# Patient Record
Sex: MALE | Race: WHITE | NOT HISPANIC OR LATINO | Employment: FULL TIME | URBAN - METROPOLITAN AREA
[De-identification: names, ages, dates, MRNs, and addresses within clinical notes are randomized per-mention and may not be internally consistent; named-entity substitution may affect disease eponyms.]

---

## 2024-01-30 ENCOUNTER — OFFICE VISIT (OUTPATIENT)
Dept: FAMILY MEDICINE CLINIC | Facility: CLINIC | Age: 34
End: 2024-01-30
Payer: COMMERCIAL

## 2024-01-30 VITALS
RESPIRATION RATE: 18 BRPM | OXYGEN SATURATION: 99 % | BODY MASS INDEX: 28.63 KG/M2 | HEIGHT: 73 IN | TEMPERATURE: 98.2 F | SYSTOLIC BLOOD PRESSURE: 121 MMHG | WEIGHT: 216 LBS | HEART RATE: 66 BPM | DIASTOLIC BLOOD PRESSURE: 70 MMHG

## 2024-01-30 DIAGNOSIS — Z11.59 ENCOUNTER FOR HCV SCREENING TEST FOR LOW RISK PATIENT: ICD-10-CM

## 2024-01-30 DIAGNOSIS — Z11.4 SCREENING FOR HIV (HUMAN IMMUNODEFICIENCY VIRUS): ICD-10-CM

## 2024-01-30 DIAGNOSIS — Z13.1 SCREENING FOR DIABETES MELLITUS (DM): ICD-10-CM

## 2024-01-30 DIAGNOSIS — Z13.220 SCREENING, LIPID: ICD-10-CM

## 2024-01-30 DIAGNOSIS — D22.5 MELANOCYTIC NEVI OF TRUNK: Primary | ICD-10-CM

## 2024-01-30 DIAGNOSIS — Z76.89 ENCOUNTER TO ESTABLISH CARE: ICD-10-CM

## 2024-01-30 PROCEDURE — 99203 OFFICE O/P NEW LOW 30 MIN: CPT

## 2024-01-30 NOTE — ASSESSMENT & PLAN NOTE
Patient has multiple melanocytic nevi of the trunk different sizes and shapes.  He states that a few of them may have changed in appearance over the last several years, but that nothing is changing rapidly.  He notes a family history of similar lesions, however denies family history of skin cancer.  Some lesions appear to be atypical nevi, others appear more similar to actinic keratoses.    Plan  Will refer to dermatology for skin check/further evaluation

## 2024-01-30 NOTE — ASSESSMENT & PLAN NOTE
Well-appearing 33-year-old male with no significant medical history.  Appropriate screening tests ordered.

## 2024-01-30 NOTE — PROGRESS NOTES
Family Medicine Follow-Up Office Visit  Johnny Nieto 33 y.o. male   MRN: [id number] : 1990  ENCOUNTER: 2024 11:37 AM    Assessment and Plan   Encounter to establish care  Well-appearing 33-year-old male with no significant medical history.  Appropriate screening tests ordered.    Melanocytic nevi of trunk  Patient has multiple melanocytic nevi of the trunk different sizes and shapes.  He states that a few of them may have changed in appearance over the last several years, but that nothing is changing rapidly.  He notes a family history of similar lesions, however denies family history of skin cancer.  Some lesions appear to be atypical nevi, others appear more similar to actinic keratoses.    Plan  Will refer to dermatology for skin check/further evaluation    BMI 28.0-28.9,adult  Will check TSH and lipid panel, follow-up after as indicated      Chief Complaint     Chief Complaint   Patient presents with    Establish Care       History of Present Illness   Johnny Nieto is a 33 y.o.-year-old male with no significant medical history who presents today for establishing care.  His only concern today is that he notes multiple moles on his torso that he states have been there for a long time and have changed minimally over that time.  He also notes that the lesions look similar to melanocytic lesions that are present in his mother.  He denies any family history of skin cancer.    Review of Systems   Review of Systems   Constitutional:  Negative for chills and fever.   HENT:  Negative for congestion, sore throat, tinnitus and trouble swallowing.    Respiratory:  Negative for chest tightness and shortness of breath.    Cardiovascular:  Negative for chest pain and leg swelling.   Gastrointestinal:  Negative for abdominal pain, constipation and diarrhea.   Skin:         Multiple melanocytic skin lesions, chronic   Neurological:  Negative for weakness and numbness.       Active Problem List  "    Patient Active Problem List   Diagnosis    Encounter to establish care    Melanocytic nevi of trunk    BMI 28.0-28.9,adult       Past Medical History, Past Surgical History, Family History, and Social History were reviewed and updated today as appropriate.    Objective   /70 (BP Location: Right arm, Patient Position: Sitting, Cuff Size: Large)   Pulse 66   Temp 98.2 °F (36.8 °C) (Tympanic)   Resp 18   Ht 6' 1\" (1.854 m)   Wt 98 kg (216 lb)   SpO2 99%   BMI 28.50 kg/m²     Physical Exam  Constitutional:       General: He is not in acute distress.     Appearance: Normal appearance.   HENT:      Head: Normocephalic and atraumatic.      Right Ear: External ear normal.      Left Ear: External ear normal.      Nose: Nose normal.      Mouth/Throat:      Mouth: Mucous membranes are moist.      Pharynx: Oropharynx is clear.   Eyes:      Extraocular Movements: Extraocular movements intact.      Conjunctiva/sclera: Conjunctivae normal.   Cardiovascular:      Rate and Rhythm: Normal rate and regular rhythm.      Pulses: Normal pulses.      Heart sounds: Normal heart sounds.   Pulmonary:      Effort: Pulmonary effort is normal.      Breath sounds: Normal breath sounds.   Abdominal:      Palpations: Abdomen is soft.      Tenderness: There is no abdominal tenderness.   Musculoskeletal:      Cervical back: Neck supple. No tenderness.      Right lower leg: No edema.      Left lower leg: No edema.   Skin:     General: Skin is warm and dry.      Comments: Multiple melanocytic lesions on trunk, some appear to be a mix of typical and atypical nevi, also actinic keratoses   Neurological:      Mental Status: He is alert and oriented to person, place, and time.   Psychiatric:         Mood and Affect: Mood normal.         Behavior: Behavior normal.       Diabetic Foot Exam    Pertinent Laboratory/Diagnostic Studies:    No results found for this or any previous visit.    Orders Placed This Encounter   Procedures    Hepatitis " C antibody    HIV 1/2 AG/AB w Reflex [facility] for 2 yr old and above    Comprehensive metabolic panel    TSH, 3rd generation with Free T4 reflex    Lipid panel    Ambulatory Referral to Dermatology         Current Medications     No current outpatient medications on file.     No current facility-administered medications for this visit.       ALLERGIES:  No Known Allergies    Health Maintenance     Health Maintenance   Topic Date Due    Hepatitis C Screening  Never done    COVID-19 Vaccine (1) Never done    HIV Screening  Never done    Annual Physical  Never done    DTaP,Tdap,and Td Vaccines (1 - Tdap) Never done    Influenza Vaccine (1) Never done    Depression Screening  01/30/2025    Zoster Vaccine (1 of 2) 04/04/2040    Pneumococcal Vaccine: Pediatrics (0 to 5 Years) and At-Risk Patients (6 to 64 Years)  Aged Out    HIB Vaccine  Aged Out    IPV Vaccine  Aged Out    Hepatitis A Vaccine  Aged Out    Meningococcal ACWY Vaccine  Aged Out    HPV Vaccine  Aged Out       There is no immunization history on file for this patient.      Compa Chamberlain DO   [facility]  1/30/2024  11:37 AM    Parts of this note were dictated using M*Modal dictation software and may have sounds-like errors due to variation in pronunciation.

## 2024-02-06 ENCOUNTER — APPOINTMENT (OUTPATIENT)
Dept: LAB | Facility: CLINIC | Age: 34
End: 2024-02-06
Payer: COMMERCIAL

## 2024-02-06 DIAGNOSIS — Z13.1 SCREENING FOR DIABETES MELLITUS (DM): ICD-10-CM

## 2024-02-06 DIAGNOSIS — Z11.4 SCREENING FOR HIV (HUMAN IMMUNODEFICIENCY VIRUS): ICD-10-CM

## 2024-02-06 DIAGNOSIS — Z11.59 ENCOUNTER FOR HCV SCREENING TEST FOR LOW RISK PATIENT: ICD-10-CM

## 2024-02-06 DIAGNOSIS — Z13.220 SCREENING, LIPID: ICD-10-CM

## 2024-02-06 LAB
ALBUMIN SERPL BCP-MCNC: 4.9 G/DL (ref 3.5–5)
ALP SERPL-CCNC: 52 U/L (ref 34–104)
ALT SERPL W P-5'-P-CCNC: 16 U/L (ref 7–52)
ANION GAP SERPL CALCULATED.3IONS-SCNC: 9 MMOL/L
AST SERPL W P-5'-P-CCNC: 19 U/L (ref 13–39)
BILIRUB SERPL-MCNC: 0.6 MG/DL (ref 0.2–1)
BUN SERPL-MCNC: 18 MG/DL (ref 5–25)
CALCIUM SERPL-MCNC: 9.8 MG/DL (ref 8.4–10.2)
CHLORIDE SERPL-SCNC: 104 MMOL/L (ref 96–108)
CHOLEST SERPL-MCNC: 158 MG/DL
CO2 SERPL-SCNC: 28 MMOL/L (ref 21–32)
CREAT SERPL-MCNC: 0.88 MG/DL (ref 0.6–1.3)
GFR SERPL CREATININE-BSD FRML MDRD: 112 ML/MIN/1.73SQ M
GLUCOSE P FAST SERPL-MCNC: 86 MG/DL (ref 65–99)
HCV AB SER QL: NORMAL
HDLC SERPL-MCNC: 46 MG/DL
HIV 1+2 AB+HIV1 P24 AG SERPL QL IA: NORMAL
HIV 2 AB SERPL QL IA: NORMAL
HIV1 AB SERPL QL IA: NORMAL
HIV1 P24 AG SERPL QL IA: NORMAL
LDLC SERPL CALC-MCNC: 101 MG/DL (ref 0–100)
NONHDLC SERPL-MCNC: 112 MG/DL
POTASSIUM SERPL-SCNC: 4.3 MMOL/L (ref 3.5–5.3)
PROT SERPL-MCNC: 7.5 G/DL (ref 6.4–8.4)
SODIUM SERPL-SCNC: 141 MMOL/L (ref 135–147)
TRIGL SERPL-MCNC: 54 MG/DL
TSH SERPL DL<=0.05 MIU/L-ACNC: 0.77 UIU/ML (ref 0.45–4.5)

## 2024-02-06 PROCEDURE — 80061 LIPID PANEL: CPT

## 2024-02-06 PROCEDURE — 80053 COMPREHEN METABOLIC PANEL: CPT

## 2024-02-06 PROCEDURE — 36415 COLL VENOUS BLD VENIPUNCTURE: CPT

## 2024-02-06 PROCEDURE — 86803 HEPATITIS C AB TEST: CPT

## 2024-02-06 PROCEDURE — 87389 HIV-1 AG W/HIV-1&-2 AB AG IA: CPT

## 2024-02-06 PROCEDURE — 84443 ASSAY THYROID STIM HORMONE: CPT

## 2024-03-12 ENCOUNTER — OFFICE VISIT (OUTPATIENT)
Dept: FAMILY MEDICINE CLINIC | Facility: CLINIC | Age: 34
End: 2024-03-12
Payer: COMMERCIAL

## 2024-03-12 VITALS
OXYGEN SATURATION: 99 % | TEMPERATURE: 97.7 F | SYSTOLIC BLOOD PRESSURE: 110 MMHG | BODY MASS INDEX: 28.36 KG/M2 | WEIGHT: 214 LBS | HEART RATE: 67 BPM | HEIGHT: 73 IN | DIASTOLIC BLOOD PRESSURE: 70 MMHG | RESPIRATION RATE: 18 BRPM

## 2024-03-12 DIAGNOSIS — Z00.00 ANNUAL PHYSICAL EXAM: Primary | ICD-10-CM

## 2024-03-12 PROCEDURE — 99395 PREV VISIT EST AGE 18-39: CPT

## 2024-03-12 NOTE — PROGRESS NOTES
ADULT ANNUAL PHYSICAL  [facility]    NAME: Johnny Nieto  AGE: 33 y.o. SEX: male  : 1990     DATE: 3/12/2024     Assessment and Plan:     Problem List Items Addressed This Visit    None  Visit Diagnoses       Annual physical exam    -  Primary              Immunizations and preventive care screenings were discussed with patient today. Appropriate education was printed on patient's after visit summary.    Counseling:  Dental Health: discussed importance of regular tooth brushing, flossing, and dental visits.  Sexual health: discussed sexually transmitted diseases, partner selection, use of condoms, avoidance of unintended pregnancy, and contraceptive alternatives.  Exercise: the importance of regular exercise/physical activity was discussed. Recommend exercise 3-5 times per week for at least 30 minutes.          Return in 1 year (on 3/12/2025).     Chief Complaint:     Chief Complaint   Patient presents with    Physical Exam      History of Present Illness:     Adult Annual Physical   Patient here for a comprehensive physical exam. The patient reports no problems.    Diet and Physical Activity  Diet/Nutrition: well balanced diet.   Exercise:  running a few times a week, somewhat limited by old hip injury. .      Depression Screening  PHQ-2/9 Depression Screening    Little interest or pleasure in doing things: 0 - not at all  Feeling down, depressed, or hopeless: 0 - not at all       General Health  Sleep: sleeps well.   Hearing: normal - bilateral.  Vision:  farsighted, no acute changes or difficulty presented at work .   Dental:  planning to establish w/ new dentist .        Health  History of STDs?: no.    Advanced Care Planning  Do you have an advanced directive? no  Do you have a durable medical power of ? No,  is default decision maker  ACP document given to the patient? no     Review of Systems:     Review of Systems    Constitutional:  Negative for chills, fatigue and fever.   HENT:  Negative for sore throat and trouble swallowing.    Eyes:  Negative for visual disturbance (rare occular migraines, decreasing in frequency).   Respiratory:  Negative for chest tightness and shortness of breath.    Cardiovascular:  Negative for chest pain.   Gastrointestinal:  Negative for abdominal pain, nausea and vomiting.   Musculoskeletal:  Positive for arthralgias (R hip, chronic).   Skin:  Negative for rash and wound.   Neurological:  Negative for dizziness, weakness and numbness.   Hematological:  Negative for adenopathy.   Psychiatric/Behavioral:  Negative for agitation, behavioral problems and confusion.       Past Medical History:     No past medical history on file.   Past Surgical History:     No past surgical history on file.   Social History:     Social History     Socioeconomic History    Marital status: /Civil Union     Spouse name: None    Number of children: None    Years of education: None    Highest education level: None   Occupational History    None   Tobacco Use    Smoking status: Never    Smokeless tobacco: Never   Vaping Use    Vaping status: Never Used   Substance and Sexual Activity    Alcohol use: Yes     Comment: Rare    Drug use: Never    Sexual activity: None   Other Topics Concern    None   Social History Narrative    None     Social Determinants of Health     Financial Resource Strain: Not on file   Food Insecurity: Not on file   Transportation Needs: Not on file   Physical Activity: Not on file   Stress: Not on file   Social Connections: Not on file   Intimate Partner Violence: Not on file   Housing Stability: Not on file      Family History:     No family history on file.   Current Medications:     No current outpatient medications on file.     No current facility-administered medications for this visit.      Allergies:     No Known Allergies   Physical Exam:     /70 (BP Location: Left arm, Patient  "Position: Sitting, Cuff Size: Large)   Pulse 67   Temp 97.7 °F (36.5 °C) (Tympanic)   Resp 18   Ht 6' 1\" (1.854 m)   Wt 97.1 kg (214 lb)   SpO2 99%   BMI 28.23 kg/m²     Physical Exam  Constitutional:       General: He is not in acute distress.     Appearance: He is not ill-appearing or toxic-appearing.   HENT:      Head: Normocephalic and atraumatic.      Nose: Nose normal.      Mouth/Throat:      Mouth: Mucous membranes are moist.      Pharynx: Oropharynx is clear.   Eyes:      Extraocular Movements: Extraocular movements intact.      Conjunctiva/sclera: Conjunctivae normal.      Pupils: Pupils are equal, round, and reactive to light.   Cardiovascular:      Rate and Rhythm: Normal rate and regular rhythm.      Heart sounds: Normal heart sounds.   Pulmonary:      Effort: Pulmonary effort is normal.      Breath sounds: Normal breath sounds.   Abdominal:      Palpations: Abdomen is soft.      Tenderness: There is no abdominal tenderness.   Musculoskeletal:      Cervical back: Normal range of motion and neck supple. No tenderness.      Right lower leg: No edema.      Left lower leg: No edema.   Lymphadenopathy:      Cervical: No cervical adenopathy.   Skin:     General: Skin is warm and dry.   Neurological:      Mental Status: He is alert and oriented to person, place, and time.   Psychiatric:         Mood and Affect: Mood normal.         Behavior: Behavior normal.          Compa Chamberlain DO   [facility]    "

## 2024-03-12 NOTE — PATIENT INSTRUCTIONS
[facility] contact: [phone]  Wellness Visit for Adults   AMBULATORY CARE:   A wellness visit  is when you see your healthcare provider to get screened for health problems. Your healthcare provider will also give you advice on how to stay healthy. Write down your questions so you remember to ask them. Ask your healthcare provider how often you should have a wellness visit.  What happens at a wellness visit:  Your healthcare provider will ask about your health, and your family history of health problems. This includes high blood pressure, heart disease, and cancer. He or she will ask if you have symptoms that concern you, if you smoke, and about your mood. You may also be asked about your intake of medicines, supplements, food, and alcohol. Any of the following may be done:  Your weight  will be checked. Your height may also be checked so your body mass index (BMI) can be calculated. Your BMI shows if you are at a healthy weight.    Your blood pressure  and heart rate will be checked. Your temperature may also be checked.    Blood and urine tests  may be done. Blood tests may be done to check your cholesterol levels. Abnormal cholesterol levels increase your risk for heart disease and stroke. You may also need a blood or urine test to check for diabetes if you are at increased risk. Urine tests may be done to look for signs of an infection or kidney disease.    A physical exam  includes checking your heartbeat and lungs with a stethoscope. Your healthcare provider may also check your skin to look for sun damage.    Screening tests  may be recommended. A screening test is done to check for diseases that may not cause symptoms. The screening tests you may need depend on your age, gender, family history, and lifestyle habits. For example, colorectal screening may be recommended if you are 50 years old or older.    Screening tests you need if you are a woman:   A Pap smear  is used to screen for  cervical cancer. Pap smears are usually done every 3 to 5 years depending on your age. You may need them more often if you have had abnormal Pap smear test results in the past. Ask your healthcare provider how often you should have a Pap smear.    A mammogram  is an x-ray of your breasts to screen for breast cancer. Experts recommend mammograms every 2 years starting at age 50 years. You may need a mammogram at age 49 years or younger if you have an increased risk for breast cancer. Talk to your healthcare provider about when you should start having mammograms and how often you need them.    Vaccines you may need:   Get an influenza vaccine  every year. The influenza vaccine protects you from the flu. Several types of viruses cause the flu. The viruses change over time, so new vaccines are made each year.    Get a tetanus-diphtheria (Td) booster vaccine  every 10 years. This vaccine protects you against tetanus and diphtheria. Tetanus is a severe infection that may cause painful muscle spasms and lockjaw. Diphtheria is a severe bacterial infection that causes a thick covering in the back of your mouth and throat.    Get a human papillomavirus (HPV) vaccine  if you are female and aged 19 to 26 or male 19 to 21 and never received it. This vaccine protects you from HPV infection. HPV is the most common infection spread by sexual contact. HPV may also cause vaginal, penile, and anal cancers.    Get a pneumococcal vaccine  if you are aged 65 years or older. The pneumococcal vaccine is an injection given to protect you from pneumococcal disease. Pneumococcal disease is an infection caused by pneumococcal bacteria. The infection may cause pneumonia, meningitis, or an ear infection.    Get a shingles vaccine  if you are 60 or older, even if you have had shingles before. The shingles vaccine is an injection to protect you from the varicella-zoster virus. This is the same virus that causes chickenpox. Shingles is a painful  rash that develops in people who had chickenpox or have been exposed to the virus.    How to eat healthy:  My Plate is a model for planning healthy meals. It shows the types and amounts of foods that should go on your plate. Fruits and vegetables make up about half of your plate, and grains and protein make up the other half. A serving of dairy is included on the side of your plate. The amount of calories and serving sizes you need depends on your age, gender, weight, and height. Examples of healthy foods are listed below:  Eat a variety of vegetables  such as dark green, red, and orange vegetables. You can also include canned vegetables low in sodium (salt) and frozen vegetables without added butter or sauces.    Eat a variety of fresh fruits , canned fruit in 100% juice, frozen fruit, and dried fruit.    Include whole grains.  At least half of the grains you eat should be whole grains. Examples include whole-wheat bread, wheat pasta, brown rice, and whole-grain cereals such as oatmeal.    Eat a variety of protein foods such as seafood (fish and shellfish), lean meat, and poultry without skin (turkey and chicken). Examples of lean meats include pork leg, shoulder, or tenderloin, and beef round, sirloin, tenderloin, and extra lean ground beef. Other protein foods include eggs and egg substitutes, beans, peas, soy products, nuts, and seeds.    Choose low-fat dairy products such as skim or 1% milk or low-fat yogurt, cheese, and cottage cheese.    Limit unhealthy fats  such as butter, hard margarine, and shortening.       Exercise:  Exercise at least 30 minutes per day on most days of the week. Some examples of exercise include walking, biking, dancing, and swimming. You can also fit in more physical activity by taking the stairs instead of the elevator or parking farther away from stores. Include muscle strengthening activities 2 days each week. Regular exercise provides many health benefits. It helps you manage your  weight, and decreases your risk for type 2 diabetes, heart disease, stroke, and high blood pressure. Exercise can also help improve your mood. Ask your healthcare provider about the best exercise plan for you.       General health and safety guidelines:   Do not smoke.  Nicotine and other chemicals in cigarettes and cigars can cause lung damage. Ask your healthcare provider for information if you currently smoke and need help to quit. E-cigarettes or smokeless tobacco still contain nicotine. Talk to your healthcare provider before you use these products.    Limit alcohol.  A drink of alcohol is 12 ounces of beer, 5 ounces of wine, or 1½ ounces of liquor.    Lose weight, if needed.  Being overweight increases your risk of certain health conditions. These include heart disease, high blood pressure, type 2 diabetes, and certain types of cancer.    Protect your skin.  Do not sunbathe or use tanning beds. Use sunscreen with a SPF 15 or higher. Apply sunscreen at least 15 minutes before you go outside. Reapply sunscreen every 2 hours. Wear protective clothing, hats, and sunglasses when you are outside.    Drive safely.  Always wear your seatbelt. Make sure everyone in your car wears a seatbelt. A seatbelt can save your life if you are in an accident. Do not use your cell phone when you are driving. This could distract you and cause an accident. Pull over if you need to make a call or send a text message.    Practice safe sex.  Use latex condoms if are sexually active and have more than one partner. Your healthcare provider may recommend screening tests for sexually transmitted infections (STIs).    Wear helmets, lifejackets, and protective gear.  Always wear a helmet when you ride a bike or motorcycle, go skiing, or play sports that could cause a head injury. Wear protective equipment when you play sports. Wear a lifejacket when you are on a boat or doing water sports.    © Copyright [employer] 2023 Information is for End  User's use only and may not be sold, redistributed or otherwise used for commercial purposes.  The above information is an  only. It is not intended as medical advice for individual conditions or treatments. Talk to your doctor, nurse or pharmacist before following any medical regimen to see if it is safe and effective for you.

## 2024-07-25 ENCOUNTER — TELEPHONE (OUTPATIENT)
Age: 34
End: 2024-07-25

## 2024-07-25 ENCOUNTER — SEXUAL HEALTH (OUTPATIENT)
Dept: SURGERY | Facility: CLINIC | Age: 34
End: 2024-07-25

## 2024-07-25 DIAGNOSIS — Z11.3 SCREENING FOR STD (SEXUALLY TRANSMITTED DISEASE): Primary | ICD-10-CM

## 2024-07-25 NOTE — PROGRESS NOTES
Assessment/Plan:    Urine collected for GC/CT testing.  Rectal and throat swabs collected for chlamydia and gonorrhea testing.  Blood collected for HIV/syphilis testing.  Recommend safer sex practices including 100% condom use.  Recommend regular STD testing.  Follow up 1 week for results.    Practicing safe sex using a condom for each sexually encounter.  Condoms offer the best protection against STD's by acting as a physical barrier to prevent the exchange of semen, vaginal fluids, and blood between partners.  Condoms are not a 100% effective in protection.    Reducing the number of sexual partners can also help to reduce the risk of the transmission of STD's along with regular STD screening.     Problem List Items Addressed This Visit    None  Visit Diagnoses       Screening for STD (sexually transmitted disease)    -  Primary              Subjective:      Patient ID: Johnny Nieto is a 34 y.o. male.    Patient presents to STD clinic for STD screening.  Reports 6 male partners in the last 3 months with inconsistent condom use.  Reports oral, receptive anal intercourse.  Denies urethral discharge, burning or pain with urination, rashes or sores, testicular or scrotal pain or swelling.  Patient interested in starting PrEP.    The following portions of the patient's history were reviewed and updated as appropriate: allergies, current medications, past family history, past medical history, past social history, past surgical history, and problem list.    Review of Systems   Constitutional:  Negative for chills, diaphoresis, fatigue and fever.   Gastrointestinal:  Negative for abdominal pain.   Genitourinary:  Negative for decreased urine volume, difficulty urinating, dysuria, frequency, genital sores, hematuria, penile discharge, penile pain, penile swelling, scrotal swelling, testicular pain and urgency.   Skin:  Negative for rash and wound.   Hematological:  Negative for adenopathy.        Objective:      There were no vitals taken for this visit.         Physical Exam  Nursing note reviewed.   Constitutional:       General: He is not in acute distress.     Appearance: Normal appearance. He is not ill-appearing, toxic-appearing or diaphoretic.   HENT:      Head: Normocephalic and atraumatic.   Eyes:      General: No scleral icterus.  Neurological:      Mental Status: He is alert and oriented to person, place, and time.   Psychiatric:         Behavior: Behavior normal.         Thought Content: Thought content normal.         Judgment: Judgment normal.              CHIEF CONCERN(S)    No LMP for male patient.      Condom Used: Occasionally    Sexual Preference :  Male    Date of Last Sexual Exposure: 3 weeks ago    # of Partners: Last 30 days 3, Last 90 days 6, and Last Year 10    Sexual Practices: Oral and Anal      Previously Sexually Transmitted Diseases  Type Date Source of Care Treatment Comment                            Test Date Results   RPR 2 years ago    HIV 2/6/24 Negative   GC 2 years aog    CT 2 years ago          1. CURRENT RISK BEHAVIOR(S)    Unprotected sex with multiple/anonymous partners and sex under the influence of drugs or alcohol     PREVIOUS SUCCESSES    Used condoms when having sex, Maintained a monogamous relationship with only one partner, Practiced abstinence, and Discussed condom use prior to having sex with partner(s)        3. SAFER GOAL BEHAVIOR(S)    Always use condoms to have sex, Practice abstinence, Pre-exposure prophylaxis (PrEP), Practice monogamy, and Get tested if condom breaks/ leaks          4. PERSON ACTION PLAN:    > BARRIERS:    No condom use or discussions and Get involved in a monogamist relationship    > BENEFITS:    Obtain free condoms from [facility] to decrease STD exposure and Provides a healthier sexual relationship and can reduce STD's        > ACTION STEPS:      Use condoms at least 50% of the time and steadily increase over time until condom use is  100% of the time, Choose to abstain from sex, Discuss condom use prior to having sex with partner(s), and Get tested is an exposure occurred such as a condom breaks/ leaked          4. REFERRALS:  HIV Consent

## 2024-07-26 ENCOUNTER — TELEPHONE (OUTPATIENT)
Dept: SURGERY | Facility: CLINIC | Age: 34
End: 2024-07-26

## 2024-07-26 DIAGNOSIS — Z11.4 SCREENING FOR HIV (HUMAN IMMUNODEFICIENCY VIRUS): ICD-10-CM

## 2024-07-26 DIAGNOSIS — Z20.2 EXPOSURE TO SEXUALLY TRANSMITTED DISEASE (STD): ICD-10-CM

## 2024-07-26 DIAGNOSIS — Z29.81 ENCOUNTER FOR PRE-EXPOSURE PROPHYLAXIS FOR HIV: Primary | ICD-10-CM

## 2024-07-26 DIAGNOSIS — Z11.59 ENCOUNTER FOR SCREENING FOR OTHER VIRAL DISEASES: ICD-10-CM

## 2024-07-26 DIAGNOSIS — Z77.21 CONTACT W AND EXPOSURE TO POTENTIALLY HAZARDOUS BODY FLUIDS: ICD-10-CM

## 2024-07-26 DIAGNOSIS — Z11.3 SCREENING EXAMINATION FOR VENEREAL DISEASE: ICD-10-CM

## 2024-07-29 NOTE — TELEPHONE ENCOUNTER
Patient had come to STD clinic and expressed interest in starting PrEP. He has not been on PrEP before.   Benefits check completed. Descovy in on formulary with no copay. Labs should be subject to deductible per representative.   Will reach out to patient to let him know outcome. If he would like to get started soon, he can go for additional labs on either Saturday or Monday and then at his STD result appt on 8/1/24 we would have his initial PrEP visit. Will await patient call back.   
Spoke with provider to verify labs patient needs. Will order CMP, Hep B and Hep C. Patient will go to lab tomorrow 7/30/24 and then come to results appt on Thursday and will do initial PrEP visit at that time since he had STD labs last week. Patient agreeable to this plan.   
negative

## 2024-07-30 ENCOUNTER — APPOINTMENT (OUTPATIENT)
Dept: LAB | Facility: CLINIC | Age: 34
End: 2024-07-30
Payer: COMMERCIAL

## 2024-07-30 DIAGNOSIS — Z11.4 SCREENING FOR HIV (HUMAN IMMUNODEFICIENCY VIRUS): ICD-10-CM

## 2024-07-30 DIAGNOSIS — Z11.59 ENCOUNTER FOR SCREENING FOR OTHER VIRAL DISEASES: ICD-10-CM

## 2024-07-30 DIAGNOSIS — Z77.21 CONTACT W AND EXPOSURE TO POTENTIALLY HAZARDOUS BODY FLUIDS: ICD-10-CM

## 2024-07-30 DIAGNOSIS — Z11.3 SCREENING EXAMINATION FOR VENEREAL DISEASE: ICD-10-CM

## 2024-07-30 DIAGNOSIS — Z29.81 ENCOUNTER FOR PRE-EXPOSURE PROPHYLAXIS FOR HIV: ICD-10-CM

## 2024-07-30 DIAGNOSIS — Z20.2 EXPOSURE TO SEXUALLY TRANSMITTED DISEASE (STD): ICD-10-CM

## 2024-07-30 LAB
ALBUMIN SERPL BCG-MCNC: 4.2 G/DL (ref 3.5–5)
ALP SERPL-CCNC: 50 U/L (ref 34–104)
ALT SERPL W P-5'-P-CCNC: 20 U/L (ref 7–52)
ANION GAP SERPL CALCULATED.3IONS-SCNC: 8 MMOL/L (ref 4–13)
AST SERPL W P-5'-P-CCNC: 43 U/L (ref 13–39)
BILIRUB SERPL-MCNC: 0.53 MG/DL (ref 0.2–1)
BUN SERPL-MCNC: 13 MG/DL (ref 5–25)
CALCIUM SERPL-MCNC: 9 MG/DL (ref 8.4–10.2)
CHLORIDE SERPL-SCNC: 104 MMOL/L (ref 96–108)
CO2 SERPL-SCNC: 27 MMOL/L (ref 21–32)
CREAT SERPL-MCNC: 0.85 MG/DL (ref 0.6–1.3)
GFR SERPL CREATININE-BSD FRML MDRD: 113 ML/MIN/1.73SQ M
GLUCOSE SERPL-MCNC: 94 MG/DL (ref 65–140)
HBV SURFACE AB SER-ACNC: <3 MIU/ML
HBV SURFACE AG SER QL: NORMAL
HCV AB SER QL: NORMAL
POTASSIUM SERPL-SCNC: 4.2 MMOL/L (ref 3.5–5.3)
PROT SERPL-MCNC: 6.8 G/DL (ref 6.4–8.4)
SODIUM SERPL-SCNC: 139 MMOL/L (ref 135–147)

## 2024-07-30 PROCEDURE — 80053 COMPREHEN METABOLIC PANEL: CPT

## 2024-07-30 PROCEDURE — 87340 HEPATITIS B SURFACE AG IA: CPT

## 2024-07-30 PROCEDURE — 86803 HEPATITIS C AB TEST: CPT

## 2024-07-30 PROCEDURE — 36415 COLL VENOUS BLD VENIPUNCTURE: CPT

## 2024-07-30 PROCEDURE — 86706 HEP B SURFACE ANTIBODY: CPT

## 2024-08-01 ENCOUNTER — SEXUAL HEALTH (OUTPATIENT)
Dept: SURGERY | Facility: CLINIC | Age: 34
End: 2024-08-01
Payer: COMMERCIAL

## 2024-08-01 VITALS
SYSTOLIC BLOOD PRESSURE: 115 MMHG | HEART RATE: 61 BPM | OXYGEN SATURATION: 100 % | DIASTOLIC BLOOD PRESSURE: 68 MMHG | HEIGHT: 73 IN | BODY MASS INDEX: 28.1 KG/M2 | WEIGHT: 212 LBS | TEMPERATURE: 98.7 F

## 2024-08-01 DIAGNOSIS — Z11.3 SCREENING FOR STD (SEXUALLY TRANSMITTED DISEASE): Primary | ICD-10-CM

## 2024-08-01 DIAGNOSIS — Z77.21 CONTACT WITH AND (SUSPECTED) EXPOSURE TO POTENTIALLY HAZARDOUS BODY FLUIDS: ICD-10-CM

## 2024-08-01 PROCEDURE — 99213 OFFICE O/P EST LOW 20 MIN: CPT | Performed by: INTERNAL MEDICINE

## 2024-08-01 RX ORDER — EMTRICITABINE AND TENOFOVIR ALAFENAMIDE 200; 25 MG/1; MG/1
1 TABLET ORAL DAILY
Qty: 90 TABLET | Refills: 0 | Status: SHIPPED | OUTPATIENT
Start: 2024-08-01

## 2024-08-01 NOTE — PROGRESS NOTES
Assessment/Plan:    Reviewed lab results with pt.   Discussed absent Hep B immunity. Recommended he follows up with his PCP for vaccination.  Next visit labs: HIV RNA, RPR, CT/GC and CMP  Take Descovy 1 tablet daily by mouth.  90 tablets, No refills  Follow up visit in 3 months with lab work complete blood work 1 week prior to next visit.  Stressed the importance of 100% medication adherence and to take his medications the same time every day  Stressed the importance of wearing condoms to prevent the transmission of HIV and STD's  Call the office with any side effects, adverse effects, or questions or concerns  Reviewed the risks vs benefits of Descovy. Discussed at length the potential side effects of medication, including but not limited to renal, hepatic, bone health side effects.  Explained in detail the expectations of follow up appointments. Reminded pt. to f/u with PCP regularly.    Discussed that Descovy does not offer 100% protection against HIV infection and I have recommended continued use of condoms with every sexual encounter. Discussed that Descovy does not protect against any other STD than HIV  Practicing safe sex using a condom for each sexually encounter.  Condoms offer the best protection against STD's by acting as a physical barrier to prevent the exchange of semen, vaginal fluids, and blood between partners.  Condoms are not a 100% effective in protection.   Reducing the number of sexual partners can also help to reduce the risk of the transmission of STD's along with regular STD screening.  Pt states understanding and wishes to proceed with PrEP        Diagnoses and all orders for this visit:    Screening for STD (sexually transmitted disease)  -     Comprehensive metabolic panel; Future  -     HIV 1/2 AG/AB w Reflex [facility] for 2 yr old and above; Future  -     RPR-Syphilis Screening (Total Syphilis IGG/IGM); Future  -     HIV-1 RNA, Quantitative PCR, [facility]; Future  -     Chlamydia/GC amplified  DNA by PCR; Future    Contact with and (suspected) exposure to potentially hazardous body fluids  -     emtricitabine-tenofovir AF (Descovy) 200-25 MG tablet; Take 1 tablet by mouth daily  -     Comprehensive metabolic panel; Future  -     HIV 1/2 AG/AB w Reflex [facility] for 2 yr old and above; Future  -     RPR-Syphilis Screening (Total Syphilis IGG/IGM); Future  -     HIV-1 RNA, Quantitative PCR, [facility]; Future  -     Chlamydia/GC amplified DNA by PCR; Future      Subjective:      Patient ID: Johnny Nieto is a 34 y.o. male.    Patient seen for initial PrEP appointment.  Patient reports multiple male partners with inconsistent condom use. Patient reports feeling well overall. Denies urethral discharge, burning or pain with urination, rashes, sores, fever, chills, testicular pain or swelling.       The following portions of the patient's history were reviewed and updated as appropriate: allergies, current medications, past family history, past medical history, past social history, past surgical history, and problem list.    Review of Systems   Constitutional:  Negative for chills, diaphoresis, fatigue and fever.   Respiratory:  Negative for cough, chest tightness, shortness of breath and wheezing.    Cardiovascular:  Negative for chest pain, palpitations and leg swelling.   Gastrointestinal:  Negative for abdominal pain, blood in stool, constipation, diarrhea, nausea and vomiting.   Genitourinary:  Negative for decreased urine volume, dysuria, frequency, genital sores, hematuria, penile discharge, scrotal swelling and testicular pain.   Skin:  Negative for rash.   Neurological:  Negative for dizziness, weakness, light-headedness and headaches.       Objective:      There were no vitals taken for this visit.         Physical Exam  Vitals and nursing note reviewed.   Constitutional:       General: He is not in acute distress.     Appearance: Normal appearance. He is not ill-appearing, toxic-appearing or  diaphoretic.   HENT:      Head: Normocephalic and atraumatic.   Neurological:      Mental Status: He is alert and oriented to person, place, and time.   Psychiatric:         Behavior: Behavior normal.         Thought Content: Thought content normal.         Judgment: Judgment normal.              CHIEF CONCERN(S)  Patient here to start prep Had STD labs done last week and additional prep labs this week      Condom Used: Occasionally    Sexual Preference :  Male    Date of Last Sexual Exposure: 3 to 4 wks ago    # of Partners: Last 30 days 3, Last 90 days 6, and Last Year 10    Sexual Practices: Oral and Anal      Previously Sexually Transmitted Diseases  Type Date Source of Care Treatment Comment                            Test Date Results   RPR 7/25/24 Non reactive   HIV 7/25/24 Non reactive   GC 7/25/24 Neg   CT 7/25/24 Neg         1. CURRENT RISK BEHAVIOR(S)    Unprotected sex with multiple/anonymous partners     PREVIOUS SUCCESSES    Used condoms when having sex, Maintained a monogamous relationship with only one partner, Practiced abstinence, and Discussed condom use prior to having sex with partner(s)        3. SAFER GOAL BEHAVIOR(S)    Always use condoms to have sex, Practice abstinence, Pre-exposure prophylaxis (PrEP), Practice monogamy, and Get tested if condom breaks/ leaks          4. PERSON ACTION PLAN:    > BARRIERS:    Get involved in a monogamist relationship    > BENEFITS:    Obtain free condoms from [facility] to decrease STD exposure and Provides a healthier sexual relationship and can reduce STD's        > ACTION STEPS:      Use condoms at least 50% of the time and steadily increase over time until condom use is 100% of the time, Choose to abstain from sex, Discuss condom use prior to having sex with partner(s), and Get tested is an exposure occurred such as a condom breaks/ leaked          4. REFERRALS:

## 2024-08-05 ENCOUNTER — OFFICE VISIT (OUTPATIENT)
Dept: FAMILY MEDICINE CLINIC | Facility: CLINIC | Age: 34
End: 2024-08-05
Payer: COMMERCIAL

## 2024-08-05 VITALS
HEIGHT: 73 IN | OXYGEN SATURATION: 98 % | DIASTOLIC BLOOD PRESSURE: 72 MMHG | SYSTOLIC BLOOD PRESSURE: 122 MMHG | BODY MASS INDEX: 27.83 KG/M2 | TEMPERATURE: 98.8 F | WEIGHT: 210 LBS | HEART RATE: 60 BPM

## 2024-08-05 DIAGNOSIS — R05.2 SUBACUTE COUGH: Primary | ICD-10-CM

## 2024-08-05 DIAGNOSIS — R09.81 NASAL CONGESTION: ICD-10-CM

## 2024-08-05 PROCEDURE — 99213 OFFICE O/P EST LOW 20 MIN: CPT

## 2024-08-05 RX ORDER — FLUTICASONE PROPIONATE 50 MCG
1 SPRAY, SUSPENSION (ML) NASAL DAILY
Qty: 16 G | Refills: 0 | Status: SHIPPED | OUTPATIENT
Start: 2024-08-05

## 2024-08-05 NOTE — ASSESSMENT & PLAN NOTE
Cough lasting for 4 weeks that started after cold symptoms developed several weeks ago  Experiencing nasal congestions and postnasal drip  Has tried OTC Zyrtec and nasal spray with minimal improvement  Physical exam remarkable for swollen turbinates, postnasal drip and cobblestoning on posterior pharynx. Lungs clear to auscultation.  Subacute cough likely to be 2/2 postinfectious cough. If cough persists over 8 weeks will consider chronic cough etiologies.    Discussed with patient that postinfectious cough can last up to 8 weeks; expectations were discussed  Take OTC antihistamine like Claritin, Zyrtec, Allegra daily  Flonase daily  Advised patient to RTC if cough persists for over 8 weeks

## 2024-08-05 NOTE — PROGRESS NOTES
Ambulatory Visit  Name: Johnny Nieto      : 1990      MRN: [id number]  Encounter Provider: Carolina James MD  Encounter Date: 2024   Encounter department: [facility]    Assessment & Plan   1. Subacute cough  Assessment & Plan:  Cough lasting for 4 weeks that started after cold symptoms developed several weeks ago  Experiencing nasal congestions and postnasal drip  Has tried OTC Zyrtec and nasal spray with minimal improvement  Physical exam remarkable for swollen turbinates, postnasal drip and cobblestoning on posterior pharynx. Lungs clear to auscultation.  Subacute cough likely to be 2/2 postinfectious cough. If cough persists over 8 weeks will consider chronic cough etiologies.    Discussed with patient that postinfectious cough can last up to 8 weeks; expectations were discussed  Take OTC antihistamine like Claritin, Zyrtec, Allegra daily  Flonase daily  Advised patient to RTC if cough persists for over 8 weeks    Orders:  -     fluticasone (FLONASE) 50 mcg/act nasal spray; 1 spray into each nostril daily  2. Nasal congestion  -     fluticasone (FLONASE) 50 mcg/act nasal spray; 1 spray into each nostril daily       History of Present Illness       Pt is a 34 y.o.male that presents for c.o. cough that has persisted for 4 weeks. He shares that the cough began 4-5 days after 10 days of cold symptoms. He states that he has nasal congestion, post-nasal drip, and occasional clear phlegm. He has tried OTC antihistamine (Zyrtec), nasal spray, and Nyquil with no improvement.  He denies fevers, nausea, vomiting, diarrhea, recent sick contacts, SOB, chest pain, or palpitations. He has only experienced something similar to this last year and the coughing eposide lasted from November to December. No other concerns today.     Review of Systems   Constitutional:  Negative for chills, fatigue and fever.   HENT:  Positive for congestion and postnasal drip. Negative for  "rhinorrhea, sinus pressure, sinus pain and sore throat.    Respiratory:  Negative for shortness of breath.    Cardiovascular:  Negative for chest pain.   Gastrointestinal:  Negative for constipation, diarrhea, nausea and vomiting.   Neurological:  Negative for dizziness, tremors, weakness and headaches.       Objective     /72 (BP Location: Right arm, Patient Position: Sitting, Cuff Size: Large)   Pulse 60   Temp 98.8 °F (37.1 °C) (Tympanic)   Ht 6' 1\" (1.854 m)   Wt 95.3 kg (210 lb)   SpO2 98%   BMI 27.71 kg/m²     Physical Exam  Constitutional:       Appearance: Normal appearance.   HENT:      Head: Normocephalic and atraumatic.      Nose: Congestion present.      Mouth/Throat:      Mouth: Mucous membranes are moist.      Pharynx: Posterior oropharyngeal erythema and postnasal drip present.      Comments: Cobblestoning on posterior pharynx  Cardiovascular:      Rate and Rhythm: Normal rate and regular rhythm.      Pulses: Normal pulses.      Heart sounds: Normal heart sounds.   Pulmonary:      Effort: Pulmonary effort is normal.      Breath sounds: Normal breath sounds.   Abdominal:      General: Bowel sounds are normal.      Palpations: Abdomen is soft.   Neurological:      Mental Status: He is alert.       Administrative Statements           "

## 2024-08-19 DIAGNOSIS — R05.2 SUBACUTE COUGH: ICD-10-CM

## 2024-08-19 DIAGNOSIS — R09.81 NASAL CONGESTION: ICD-10-CM

## 2024-08-20 RX ORDER — FLUTICASONE PROPIONATE 50 MCG
1 SPRAY, SUSPENSION (ML) NASAL DAILY
Qty: 48 ML | Refills: 2 | Status: SHIPPED | OUTPATIENT
Start: 2024-08-20

## 2024-09-04 PROBLEM — R05.2 SUBACUTE COUGH: Status: RESOLVED | Noted: 2024-08-05 | Resolved: 2024-09-04

## 2024-10-25 ENCOUNTER — APPOINTMENT (OUTPATIENT)
Dept: LAB | Facility: CLINIC | Age: 34
End: 2024-10-25
Payer: COMMERCIAL

## 2024-10-25 DIAGNOSIS — Z77.21 CONTACT WITH AND (SUSPECTED) EXPOSURE TO POTENTIALLY HAZARDOUS BODY FLUIDS: ICD-10-CM

## 2024-10-25 DIAGNOSIS — Z11.3 SCREENING FOR STD (SEXUALLY TRANSMITTED DISEASE): ICD-10-CM

## 2024-10-25 LAB
ALBUMIN SERPL BCG-MCNC: 4.2 G/DL (ref 3.5–5)
ALP SERPL-CCNC: 49 U/L (ref 34–104)
ALT SERPL W P-5'-P-CCNC: 11 U/L (ref 7–52)
ANION GAP SERPL CALCULATED.3IONS-SCNC: 8 MMOL/L (ref 4–13)
AST SERPL W P-5'-P-CCNC: 14 U/L (ref 13–39)
BILIRUB SERPL-MCNC: 0.48 MG/DL (ref 0.2–1)
BUN SERPL-MCNC: 19 MG/DL (ref 5–25)
CALCIUM SERPL-MCNC: 8.8 MG/DL (ref 8.4–10.2)
CHLORIDE SERPL-SCNC: 104 MMOL/L (ref 96–108)
CO2 SERPL-SCNC: 28 MMOL/L (ref 21–32)
CREAT SERPL-MCNC: 0.89 MG/DL (ref 0.6–1.3)
GFR SERPL CREATININE-BSD FRML MDRD: 111 ML/MIN/1.73SQ M
GLUCOSE SERPL-MCNC: 95 MG/DL (ref 65–140)
HIV 1+2 AB+HIV1 P24 AG SERPL QL IA: NORMAL
HIV 2 AB SERPL QL IA: NORMAL
HIV1 AB SERPL QL IA: NORMAL
HIV1 P24 AG SERPL QL IA: NORMAL
POTASSIUM SERPL-SCNC: 4.2 MMOL/L (ref 3.5–5.3)
PROT SERPL-MCNC: 6.6 G/DL (ref 6.4–8.4)
SODIUM SERPL-SCNC: 140 MMOL/L (ref 135–147)
TREPONEMA PALLIDUM IGG+IGM AB [PRESENCE] IN SERUM OR PLASMA BY IMMUNOASSAY: NORMAL

## 2024-10-25 PROCEDURE — 86780 TREPONEMA PALLIDUM: CPT

## 2024-10-25 PROCEDURE — 87389 HIV-1 AG W/HIV-1&-2 AB AG IA: CPT

## 2024-10-25 PROCEDURE — 87591 N.GONORRHOEAE DNA AMP PROB: CPT

## 2024-10-25 PROCEDURE — 36415 COLL VENOUS BLD VENIPUNCTURE: CPT

## 2024-10-25 PROCEDURE — 87491 CHLMYD TRACH DNA AMP PROBE: CPT

## 2024-10-25 PROCEDURE — 80053 COMPREHEN METABOLIC PANEL: CPT

## 2024-10-25 PROCEDURE — 87536 HIV-1 QUANT&REVRSE TRNSCRPJ: CPT

## 2024-10-28 LAB
C TRACH DNA SPEC QL NAA+PROBE: NEGATIVE
HIV1 RNA # PLAS NAA DL=20: NOT DETECTED {COPIES}/ML
N GONORRHOEA DNA SPEC QL NAA+PROBE: NEGATIVE

## 2024-10-31 ENCOUNTER — SEXUAL HEALTH (OUTPATIENT)
Dept: SURGERY | Facility: CLINIC | Age: 34
End: 2024-10-31
Payer: COMMERCIAL

## 2024-10-31 VITALS
BODY MASS INDEX: 27.7 KG/M2 | WEIGHT: 209 LBS | DIASTOLIC BLOOD PRESSURE: 73 MMHG | HEART RATE: 65 BPM | HEIGHT: 73 IN | TEMPERATURE: 97.8 F | SYSTOLIC BLOOD PRESSURE: 114 MMHG

## 2024-10-31 DIAGNOSIS — Z11.3 ENCOUNTER FOR SPECIAL SCREENING EXAMINATION FOR INFECTION WITH PREDOMINANTLY SEXUAL MODE OF TRANSMISSION: ICD-10-CM

## 2024-10-31 DIAGNOSIS — Z11.4 SCREENING FOR HUMAN IMMUNODEFICIENCY VIRUS: ICD-10-CM

## 2024-10-31 DIAGNOSIS — Z20.6 CONTACT W AND (SUSPECTED) EXPOSURE TO HUMAN IMMUNODEF VIRUS: Primary | ICD-10-CM

## 2024-10-31 DIAGNOSIS — Z77.21 CONTACT W AND EXPOSURE TO POTENTIALLY HAZARDOUS BODY FLUIDS: ICD-10-CM

## 2024-10-31 DIAGNOSIS — Z77.21 PERSONAL HISTORY OF EXPOSURE TO POTENTIALLY HAZARDOUS BODY FLUIDS: ICD-10-CM

## 2024-10-31 DIAGNOSIS — Z11.3 ENCOUNTER FOR SCREENING FOR INFECTIONS WITH A PREDOMINANTLY SEXUAL MODE OF TRANSMISSION: ICD-10-CM

## 2024-10-31 DIAGNOSIS — Z29.81 ENCOUNTER FOR PRE-EXPOSURE PROPHYLAXIS FOR HIV: ICD-10-CM

## 2024-10-31 DIAGNOSIS — Z20.2 CONTACT W AND EXPOSURE TO INFECT W A SEXL MODE OF TRANSMISS: ICD-10-CM

## 2024-10-31 DIAGNOSIS — Z79.899 ENCOUNTER FOR LONG-TERM (CURRENT) USE OF HIGH-RISK MEDICATION: ICD-10-CM

## 2024-10-31 DIAGNOSIS — Z77.21 CONTACT WITH AND (SUSPECTED) EXPOSURE TO POTENTIALLY HAZARDOUS BODY FLUIDS: ICD-10-CM

## 2024-10-31 DIAGNOSIS — Z11.3 SCREENING FOR STD (SEXUALLY TRANSMITTED DISEASE): ICD-10-CM

## 2024-10-31 PROCEDURE — 99213 OFFICE O/P EST LOW 20 MIN: CPT | Performed by: INTERNAL MEDICINE

## 2024-10-31 RX ORDER — EMTRICITABINE AND TENOFOVIR ALAFENAMIDE 200; 25 MG/1; MG/1
1 TABLET ORAL DAILY
Qty: 90 TABLET | Refills: 0 | Status: SHIPPED | OUTPATIENT
Start: 2024-10-31 | End: 2025-01-29

## 2024-10-31 NOTE — PROGRESS NOTES
Assessment/Plan:     Reviewed lab results with pt.   Next visit labs: HIV RNA, RPR, CT/GC  Take Descovy 1 tablet daily by mouth.  90 tablets, No refills  Follow up visit in 3 months with lab work complete blood work 1 week prior to next visit.  Stressed the importance of 100% medication adherence and to take his medications the same time every day  Stressed the importance of wearing condoms to prevent the transmission of HIV and STD's  Call the office with any side effects, adverse effects, or questions or concerns  Reviewed the risks vs benefits of Descovy. Discussed at length the potential side effects of medication, including but not limited to renal, hepatic, bone health side effects.  Explained in detail the expectations of follow up appointments. Reminded pt. to f/u with PCP regularly.    Discussed that Descovy does not offer 100% protection against HIV infection and I have recommended continued use of condoms with every sexual encounter. Discussed that Descovy does not protect against any other STD than HIV  Practicing safe sex using a condom for each sexually encounter.  Condoms offer the best protection against STD's by acting as a physical barrier to prevent the exchange of semen, vaginal fluids, and blood between partners.  Condoms are not a 100% effective in protection.   Reducing the number of sexual partners can also help to reduce the risk of the transmission of STD's along with regular STD screening.  Pt states understanding     Problem List Items Addressed This Visit    None  Visit Diagnoses       Contact w and (suspected) exposure to human immunodef virus    -  Primary    Screening for human immunodeficiency virus        Contact w and exposure to infect w a sexl mode of transmiss        Contact w and exposure to potentially hazardous body fluids        Encounter for screening for infections with a predominantly sexual mode of transmission        Encounter for special screening examination for  infection with predominantly sexual mode of transmission        Personal history of exposure to potentially hazardous body fluids        Encounter for pre-exposure prophylaxis for HIV        Encounter for long-term (current) use of high-risk medication        Screening for STD (sexually transmitted disease)                  Subjective:      Patient ID: Johnny Nieto is a 34 y.o. male.    Patient presents to the clinic for 3 months PrEP follow-up visit.  Reports feeling well overall.  Denies any side effects from the medication.  Reports taking the medication daily and denies missed doses.  Currently sexually active with 1 male partner.  Does not use condoms.  Denies burning or pain with urination, blood in urine, fever, chills, rashes, sores, urethral discharge, testicular pain or swelling.    The following portions of the patient's history were reviewed and updated as appropriate: allergies, current medications, past family history, past medical history, past social history, past surgical history, and problem list.    Review of Systems   Constitutional:  Negative for chills, diaphoresis, fatigue and fever.   HENT:  Negative for congestion and sore throat.    Respiratory:  Negative for cough, chest tightness, shortness of breath and wheezing.    Cardiovascular:  Negative for chest pain, palpitations and leg swelling.   Genitourinary:  Negative for dysuria, frequency, genital sores, hematuria, penile discharge, penile pain, penile swelling, scrotal swelling and testicular pain.   Neurological:  Negative for dizziness and headaches.       Objective:      Wt 94.8 kg (209 lb)   BMI 27.57 kg/m²          Physical Exam  Vitals and nursing note reviewed.   Constitutional:       General: He is not in acute distress.     Appearance: Normal appearance. He is not ill-appearing, toxic-appearing or diaphoretic.   HENT:      Head: Normocephalic and atraumatic.      Mouth/Throat:      Mouth: Mucous membranes are moist.       Pharynx: No oropharyngeal exudate or posterior oropharyngeal erythema.   Eyes:      General: No scleral icterus.     Pupils: Pupils are equal, round, and reactive to light.   Cardiovascular:      Rate and Rhythm: Normal rate and regular rhythm.      Heart sounds: Normal heart sounds.   Pulmonary:      Effort: Pulmonary effort is normal.      Breath sounds: Normal breath sounds. No wheezing or rales.   Musculoskeletal:      Right lower leg: No edema.      Left lower leg: No edema.   Skin:     General: Skin is warm and dry.      Coloration: Skin is not jaundiced or pale.   Neurological:      Mental Status: He is alert and oriented to person, place, and time.      Gait: Gait normal.   Psychiatric:         Mood and Affect: Mood normal.         Behavior: Behavior normal.         Thought Content: Thought content normal.         Judgment: Judgment normal.              CHIEF CONCERN(S)    No concerns with PrEP - Descovy not having any side effects.     No LMP for male patient.      Condom Used: Occasionally    Sexual Preference :  Male    # of Partners: Last 30 days 1, Last 90 days 3, and Last Year 10    Sexual Practices: Oral and Anal      Previously Sexually Transmitted Diseases  Type Date Source of Care Treatment Comment   N/A                         Test Date Results   RPR 10/25/24 Non reactive   HIV 10/25/24 Non reactive   GC 10/25/24 Neg   CT 10/25/24 Neg         1. CURRENT RISK BEHAVIOR(S)    Other - none     PREVIOUS SUCCESSES    Used condoms when having sex, Maintained a monogamous relationship with only one partner, Practiced abstinence, and Discussed condom use prior to having sex with partner(s)        3. SAFER GOAL BEHAVIOR(S)    Always use condoms to have sex, Practice abstinence, Pre-exposure prophylaxis (PrEP), and Practice monogamy          4. PERSON ACTION PLAN:    > BARRIERS:    No condom use or discussions and Get involved in a monogamist relationship    > BENEFITS:    Obtain free condoms from [facility] to  decrease STD exposure and Provides a healthier sexual relationship and can reduce STD's        > ACTION STEPS:      Use condoms at least 50% of the time and steadily increase over time until condom use is 100% of the time, Choose to abstain from sex, Discuss condom use prior to having sex with partner(s), and Get tested is an exposure occurred such as a condom breaks/ leaked          4. REFERRALS:    HIV consent for next testing

## 2025-01-14 ENCOUNTER — OFFICE VISIT (OUTPATIENT)
Dept: FAMILY MEDICINE CLINIC | Facility: CLINIC | Age: 35
End: 2025-01-14
Payer: COMMERCIAL

## 2025-01-14 VITALS
DIASTOLIC BLOOD PRESSURE: 68 MMHG | TEMPERATURE: 98.2 F | HEART RATE: 68 BPM | SYSTOLIC BLOOD PRESSURE: 119 MMHG | HEIGHT: 73 IN | BODY MASS INDEX: 28.23 KG/M2 | WEIGHT: 213 LBS | OXYGEN SATURATION: 99 %

## 2025-01-14 DIAGNOSIS — J34.2 DEVIATED SEPTUM: ICD-10-CM

## 2025-01-14 DIAGNOSIS — R53.83 OTHER FATIGUE: Primary | ICD-10-CM

## 2025-01-14 DIAGNOSIS — Z13.6 SCREENING FOR CARDIOVASCULAR CONDITION: ICD-10-CM

## 2025-01-14 DIAGNOSIS — F32.89 OTHER DEPRESSION: ICD-10-CM

## 2025-01-14 DIAGNOSIS — R09.81 CHRONIC NASAL CONGESTION: ICD-10-CM

## 2025-01-14 PROCEDURE — 99213 OFFICE O/P EST LOW 20 MIN: CPT

## 2025-01-14 NOTE — PROGRESS NOTES
Name: Johnny Nieto      : 1990      MRN: [id number]  Encounter Provider: Carolina James MD  Encounter Date: 2025   [facility]  :  Assessment & Plan  Other fatigue  Worsening fatigue since past summer without any other accompanying symptoms. Of note, patient reports intense two past years and his PHQ was 10. Likely underlying depression causing fatigue, however will evaluate for different etiologies as below.  RTC in 4-6 weeks for annual physical   Orders:    TSH, 3rd generation with Free T4 reflex; Future    Vitamin D 25 hydroxy; Future    Vitamin B12; Future    Other depression  Depression Screening Follow-up Plan: Patient's depression screening was positive with a PHQ-2 score of 5. Their PHQ-9 score was 10. Patient advised to follow-up with PCP for further management. Fatigue likely due to underlying depression, although patient is hesitant on medication at this time. He is somewhat open to talk therapy. No SI, HI. Discussed meditation, breathing techniques among other ways to cope with stress and mood.   Orders:    Ambulatory referral to Psych Services; Future    Chronic nasal congestion  Chronic nasal congestion refractory to continued use of flonase. Likely deviated septum exacerbating congestion. Will send to ENT for evaluation  Orders:    Ambulatory Referral to Otolaryngology; Future    Deviated septum    Orders:    Ambulatory Referral to Otolaryngology; Future    Screening for cardiovascular condition    Orders:    Comprehensive metabolic panel; Future    Lipid Panel with Direct LDL reflex; Future           History of Present Illness     Johnny is a 34 year old male presenting to the office with concerns of fatigue. He has noticed having low energy since this past summer but has been worsening since then. He does note he has had an intense past two years. Denies dizziness, lightheadedness, CP, SOB. Of note, mother has history of  "thyroid disease and patient is wondering if this is causing his symptoms. Patient also mentions being more anxious and depressed at times.    Johnny also has concerns in regards to chronic congestion. He has previously tried Flonase daily for several weeks with minimal to none relief.           Review of Systems   Constitutional:  Positive for fatigue. Negative for unexpected weight change.   HENT:  Positive for congestion.    Neurological:  Negative for dizziness, light-headedness and numbness.       Objective   /68 (BP Location: Left arm, Patient Position: Sitting, Cuff Size: Large)   Pulse 68   Temp 98.2 °F (36.8 °C) (Tympanic)   Ht 6' 1\" (1.854 m)   Wt 96.6 kg (213 lb)   SpO2 99%   BMI 28.10 kg/m²      Physical Exam  Constitutional:       General: He is not in acute distress.     Appearance: Normal appearance. He is not ill-appearing.   HENT:      Head: Normocephalic and atraumatic.      Nose: Congestion present.      Right Turbinates: Enlarged.      Left Turbinates: Enlarged.   Cardiovascular:      Rate and Rhythm: Normal rate and regular rhythm.      Pulses: Normal pulses.      Heart sounds: Normal heart sounds.   Pulmonary:      Effort: Pulmonary effort is normal. No respiratory distress.      Breath sounds: Normal breath sounds. No wheezing.   Abdominal:      General: Abdomen is flat. Bowel sounds are normal. There is no distension.      Palpations: Abdomen is soft.      Tenderness: There is no abdominal tenderness.   Musculoskeletal:      Right lower leg: No edema.      Left lower leg: No edema.   Skin:     General: Skin is warm.      Capillary Refill: Capillary refill takes less than 2 seconds.   Neurological:      Mental Status: He is alert and oriented to person, place, and time.       "

## 2025-02-03 ENCOUNTER — APPOINTMENT (OUTPATIENT)
Dept: LAB | Facility: CLINIC | Age: 35
End: 2025-02-03
Payer: COMMERCIAL

## 2025-02-03 DIAGNOSIS — Z20.2 CONTACT W AND EXPOSURE TO INFECT W A SEXL MODE OF TRANSMISS: ICD-10-CM

## 2025-02-03 DIAGNOSIS — Z11.3 ENCOUNTER FOR SCREENING FOR INFECTIONS WITH A PREDOMINANTLY SEXUAL MODE OF TRANSMISSION: ICD-10-CM

## 2025-02-03 DIAGNOSIS — Z13.6 SCREENING FOR CARDIOVASCULAR CONDITION: ICD-10-CM

## 2025-02-03 DIAGNOSIS — Z77.21 PERSONAL HISTORY OF EXPOSURE TO POTENTIALLY HAZARDOUS BODY FLUIDS: ICD-10-CM

## 2025-02-03 DIAGNOSIS — Z79.899 ENCOUNTER FOR LONG-TERM (CURRENT) USE OF HIGH-RISK MEDICATION: ICD-10-CM

## 2025-02-03 DIAGNOSIS — R53.83 OTHER FATIGUE: ICD-10-CM

## 2025-02-03 DIAGNOSIS — Z77.21 CONTACT W AND EXPOSURE TO POTENTIALLY HAZARDOUS BODY FLUIDS: ICD-10-CM

## 2025-02-03 DIAGNOSIS — Z11.4 SCREENING FOR HUMAN IMMUNODEFICIENCY VIRUS: ICD-10-CM

## 2025-02-03 DIAGNOSIS — Z29.81 ENCOUNTER FOR PRE-EXPOSURE PROPHYLAXIS FOR HIV: ICD-10-CM

## 2025-02-03 DIAGNOSIS — Z20.6 CONTACT W AND (SUSPECTED) EXPOSURE TO HUMAN IMMUNODEF VIRUS: ICD-10-CM

## 2025-02-03 DIAGNOSIS — Z11.3 ENCOUNTER FOR SPECIAL SCREENING EXAMINATION FOR INFECTION WITH PREDOMINANTLY SEXUAL MODE OF TRANSMISSION: ICD-10-CM

## 2025-02-03 LAB
25(OH)D3 SERPL-MCNC: 25.1 NG/ML (ref 30–100)
TSH SERPL DL<=0.05 MIU/L-ACNC: 0.7 UIU/ML (ref 0.45–4.5)
VIT B12 SERPL-MCNC: 336 PG/ML (ref 180–914)

## 2025-02-03 PROCEDURE — 86780 TREPONEMA PALLIDUM: CPT

## 2025-02-03 PROCEDURE — 36415 COLL VENOUS BLD VENIPUNCTURE: CPT

## 2025-02-03 PROCEDURE — 87535 HIV-1 PROBE&REVERSE TRNSCRPJ: CPT

## 2025-02-03 PROCEDURE — 87591 N.GONORRHOEAE DNA AMP PROB: CPT

## 2025-02-03 PROCEDURE — 82306 VITAMIN D 25 HYDROXY: CPT

## 2025-02-03 PROCEDURE — 84443 ASSAY THYROID STIM HORMONE: CPT

## 2025-02-03 PROCEDURE — 87538 HIV-2 PROBE&REVRSE TRNSCRIPJ: CPT | Performed by: INTERNAL MEDICINE

## 2025-02-03 PROCEDURE — 87389 HIV-1 AG W/HIV-1&-2 AB AG IA: CPT

## 2025-02-03 PROCEDURE — 87491 CHLMYD TRACH DNA AMP PROBE: CPT

## 2025-02-03 PROCEDURE — 82607 VITAMIN B-12: CPT

## 2025-02-04 ENCOUNTER — APPOINTMENT (OUTPATIENT)
Dept: LAB | Facility: CLINIC | Age: 35
End: 2025-02-04
Payer: COMMERCIAL

## 2025-02-04 LAB
ALBUMIN SERPL BCG-MCNC: 4.5 G/DL (ref 3.5–5)
ALP SERPL-CCNC: 46 U/L (ref 34–104)
ALT SERPL W P-5'-P-CCNC: 16 U/L (ref 7–52)
ANION GAP SERPL CALCULATED.3IONS-SCNC: 8 MMOL/L (ref 4–13)
AST SERPL W P-5'-P-CCNC: 14 U/L (ref 13–39)
BILIRUB SERPL-MCNC: 0.73 MG/DL (ref 0.2–1)
BUN SERPL-MCNC: 24 MG/DL (ref 5–25)
C TRACH DNA SPEC QL NAA+PROBE: NEGATIVE
CALCIUM SERPL-MCNC: 9.1 MG/DL (ref 8.4–10.2)
CHLORIDE SERPL-SCNC: 104 MMOL/L (ref 96–108)
CHOLEST SERPL-MCNC: 162 MG/DL (ref ?–200)
CO2 SERPL-SCNC: 27 MMOL/L (ref 21–32)
CREAT SERPL-MCNC: 1.08 MG/DL (ref 0.6–1.3)
GFR SERPL CREATININE-BSD FRML MDRD: 89 ML/MIN/1.73SQ M
GLUCOSE P FAST SERPL-MCNC: 90 MG/DL (ref 65–99)
HDLC SERPL-MCNC: 45 MG/DL
HIV 1+2 AB+HIV1 P24 AG SERPL QL IA: NORMAL
HIV 2 AB SERPL QL IA: NORMAL
HIV1 AB SERPL QL IA: NORMAL
HIV1 P24 AG SERPL QL IA: NORMAL
LDLC SERPL CALC-MCNC: 102 MG/DL (ref 0–100)
N GONORRHOEA DNA SPEC QL NAA+PROBE: NEGATIVE
POTASSIUM SERPL-SCNC: 4 MMOL/L (ref 3.5–5.3)
PROT SERPL-MCNC: 6.9 G/DL (ref 6.4–8.4)
SODIUM SERPL-SCNC: 139 MMOL/L (ref 135–147)
TREPONEMA PALLIDUM IGG+IGM AB [PRESENCE] IN SERUM OR PLASMA BY IMMUNOASSAY: NORMAL
TRIGL SERPL-MCNC: 77 MG/DL (ref ?–150)

## 2025-02-04 PROCEDURE — 80053 COMPREHEN METABOLIC PANEL: CPT

## 2025-02-04 PROCEDURE — 36415 COLL VENOUS BLD VENIPUNCTURE: CPT

## 2025-02-04 PROCEDURE — 80061 LIPID PANEL: CPT

## 2025-02-06 ENCOUNTER — TELEMEDICINE (OUTPATIENT)
Dept: SURGERY | Facility: CLINIC | Age: 35
End: 2025-02-06
Payer: COMMERCIAL

## 2025-02-06 DIAGNOSIS — Z11.3 ENCOUNTER FOR SPECIAL SCREENING EXAMINATION FOR INFECTION WITH PREDOMINANTLY SEXUAL MODE OF TRANSMISSION: ICD-10-CM

## 2025-02-06 DIAGNOSIS — Z20.6 CONTACT W AND (SUSPECTED) EXPOSURE TO HUMAN IMMUNODEF VIRUS: ICD-10-CM

## 2025-02-06 DIAGNOSIS — Z77.21 CONTACT WITH AND (SUSPECTED) EXPOSURE TO POTENTIALLY HAZARDOUS BODY FLUIDS: ICD-10-CM

## 2025-02-06 DIAGNOSIS — Z11.4 SCREENING FOR HUMAN IMMUNODEFICIENCY VIRUS: ICD-10-CM

## 2025-02-06 DIAGNOSIS — Z29.81 ENCOUNTER FOR PRE-EXPOSURE PROPHYLAXIS FOR HIV: ICD-10-CM

## 2025-02-06 DIAGNOSIS — Z77.21 CONTACT W AND EXPOSURE TO POTENTIALLY HAZARDOUS BODY FLUIDS: ICD-10-CM

## 2025-02-06 DIAGNOSIS — Z77.21 PERSONAL HISTORY OF EXPOSURE TO POTENTIALLY HAZARDOUS BODY FLUIDS: ICD-10-CM

## 2025-02-06 DIAGNOSIS — Z20.2 CONTACT W AND EXPOSURE TO INFECT W A SEXL MODE OF TRANSMISS: Primary | ICD-10-CM

## 2025-02-06 DIAGNOSIS — Z11.3 ENCOUNTER FOR SCREENING FOR INFECTIONS WITH A PREDOMINANTLY SEXUAL MODE OF TRANSMISSION: ICD-10-CM

## 2025-02-06 LAB — MISCELLANEOUS LAB TEST RESULT: NORMAL

## 2025-02-06 PROCEDURE — 99213 OFFICE O/P EST LOW 20 MIN: CPT | Performed by: INTERNAL MEDICINE

## 2025-02-06 RX ORDER — EMTRICITABINE AND TENOFOVIR ALAFENAMIDE 200; 25 MG/1; MG/1
1 TABLET ORAL DAILY
Qty: 90 TABLET | Refills: 0 | Status: SHIPPED | OUTPATIENT
Start: 2025-02-06 | End: 2025-05-07

## 2025-02-06 NOTE — PROGRESS NOTES
Assessment/Plan:           Problem List Items Addressed This Visit    None  Visit Diagnoses         Contact w and exposure to infect w a sexl mode of transmiss    -  Primary    Relevant Orders    Chlamydia/GC amplified DNA by PCR    RPR-Syphilis Screening (Total Syphilis IGG/IGM)    HIV 1/2 AG/AB w Reflex [facility] for 2 yr old and above    HIV-1 RNA, quantitative, PCR      Encounter for special screening examination for infection with predominantly sexual mode of transmission        Relevant Orders    Chlamydia/GC amplified DNA by PCR    RPR-Syphilis Screening (Total Syphilis IGG/IGM)    HIV 1/2 AG/AB w Reflex [facility] for 2 yr old and above    HIV-1 RNA, quantitative, PCR      Personal history of exposure to potentially hazardous body fluids        Relevant Orders    Chlamydia/GC amplified DNA by PCR    RPR-Syphilis Screening (Total Syphilis IGG/IGM)    HIV 1/2 AG/AB w Reflex [facility] for 2 yr old and above    HIV-1 RNA, quantitative, PCR      Contact w and (suspected) exposure to human immunodef virus        Relevant Orders    Chlamydia/GC amplified DNA by PCR    RPR-Syphilis Screening (Total Syphilis IGG/IGM)    HIV 1/2 AG/AB w Reflex [facility] for 2 yr old and above    HIV-1 RNA, quantitative, PCR      Screening for human immunodeficiency virus        Relevant Orders    Chlamydia/GC amplified DNA by PCR    RPR-Syphilis Screening (Total Syphilis IGG/IGM)    HIV 1/2 AG/AB w Reflex [facility] for 2 yr old and above    HIV-1 RNA, quantitative, PCR      Contact w and exposure to potentially hazardous body fluids        Relevant Orders    Chlamydia/GC amplified DNA by PCR    RPR-Syphilis Screening (Total Syphilis IGG/IGM)    HIV 1/2 AG/AB w Reflex [facility] for 2 yr old and above    HIV-1 RNA, quantitative, PCR      Encounter for screening for infections with a predominantly sexual mode of transmission        Relevant Orders    Chlamydia/GC amplified DNA by PCR    RPR-Syphilis Screening (Total Syphilis IGG/IGM)    HIV 1/2 AG/AB w Reflex  [facility] for 2 yr old and above    HIV-1 RNA, quantitative, PCR      Encounter for pre-exposure prophylaxis for HIV        Relevant Orders    Chlamydia/GC amplified DNA by PCR    RPR-Syphilis Screening (Total Syphilis IGG/IGM)    HIV 1/2 AG/AB w Reflex [facility] for 2 yr old and above    HIV-1 RNA, quantitative, PCR      Contact with and (suspected) exposure to potentially hazardous body fluids        Relevant Medications    emtricitabine-tenofovir AF (Descovy) 200-25 MG tablet    Other Relevant Orders    Chlamydia/GC amplified DNA by PCR    RPR-Syphilis Screening (Total Syphilis IGG/IGM)    HIV 1/2 AG/AB w Reflex [facility] for 2 yr old and above    HIV-1 RNA, quantitative, PCR            Reviewed lab results with pt.   Next visit labs: HIV RNA, RPR, CT/GC  Take Descovy 1 tablet daily by mouth.  90 tablets, No refills  Follow up visit in 3 months with lab work complete blood work 1 week prior to next visit.  Stressed the importance of 100% medication adherence and to take his medications the same time every day  Stressed the importance of wearing condoms to prevent the transmission of HIV and STD's  Call the office with any side effects, adverse effects, or questions or concerns  Reviewed the risks vs benefits of Descovy. Discussed at length the potential side effects of medication, including but not limited to renal, hepatic, bone health side effects.  Explained in detail the expectations of follow up appointments. Reminded pt. to f/u with PCP regularly.    Discussed that Descovy does not offer 100% protection against HIV infection and I have recommended continued use of condoms with every sexual encounter. Discussed that Descovy does not protect against any other STD than HIV  Practicing safe sex using a condom for each sexually encounter.  Condoms offer the best protection against STD's by acting as a physical barrier to prevent the exchange of semen, vaginal fluids, and blood between partners.  Condoms are not a 100%  effective in protection.   Reducing the number of sexual partners can also help to reduce the risk of the transmission of STD's along with regular STD screening.  Pt states understanding     Subjective:      Patient ID: Johnny Nieto is a 34 y.o. male.    Patient seen in virtual visit for 3-month PrEP follow-up.  Patient reports tolerating medication well, denies side effects.  Reports taking medication as prescribed, denies missed doses.  Reports being sexually active with 2 partners with inconsistent condom use.  Denies burning or pain with urination, urethral discharge, rashes, sores, fever, chills, testicular pain or swelling.    The following portions of the patient's history were reviewed and updated as appropriate: allergies, current medications, past family history, past medical history, past social history, past surgical history, and problem list.    Review of Systems   Constitutional:  Negative for chills, diaphoresis, fatigue and fever.   Respiratory:  Negative for cough, chest tightness, shortness of breath and wheezing.    Gastrointestinal:  Negative for abdominal pain.   Genitourinary:  Negative for decreased urine volume, difficulty urinating, dysuria, frequency, genital sores, hematuria, penile discharge, penile pain, penile swelling, scrotal swelling, testicular pain and urgency.   Skin:  Negative for rash and wound.   Hematological:  Negative for adenopathy.       Objective:      There were no vitals taken for this visit.         Physical Exam  Nursing note reviewed.   Constitutional:       General: He is not in acute distress.     Appearance: Normal appearance. He is not ill-appearing.   Neurological:      Mental Status: He is alert and oriented to person, place, and time.   Psychiatric:         Thought Content: Thought content normal.         Judgment: Judgment normal.       PE limited due to visit being a video virtual visit.        CHIEF CONCERN(S)    Visit to be virtual today due to  inclimate weather.     No LMP for male patient.    Condom Used: Occasionally    Sexual Preference :  Male    Sexual Practices: Oral and Anal      Previously Sexually Transmitted Diseases  Type Date Source of Care Treatment Comment   N/A                         Test Date Results   RPR 2/3/25 Non reactive   HIV 2/3/25 Non reactive   GC 2/3/25 Negative   CT 2/3/25 Negative         1. CURRENT RISK BEHAVIOR(S)    Unprotected sex with multiple/anonymous partners     PREVIOUS SUCCESSES    Used condoms when having sex, Maintained a monogamous relationship with only one partner, Practiced abstinence, and Discussed condom use prior to having sex with partner(s)        3. SAFER GOAL BEHAVIOR(S)    Always use condoms to have sex, Practice abstinence, Pre-exposure prophylaxis (PrEP), Practice monogamy, and Get tested if condom breaks/ leaks          4. PERSON ACTION PLAN:    > BARRIERS:    No condom use or discussions and Get involved in a monogamist relationship    > BENEFITS:    Obtain free condoms from [facility] to decrease STD exposure and Provides a healthier sexual relationship and can reduce STD's        > ACTION STEPS:      Use condoms at least 50% of the time and steadily increase over time until condom use is 100% of the time, Choose to abstain from sex, Discuss condom use prior to having sex with partner(s), and Get tested is an exposure occurred such as a condom breaks/ leaked          4. REFERRALS:

## 2025-02-07 ENCOUNTER — RA CDI HCC (OUTPATIENT)
Dept: OTHER | Facility: HOSPITAL | Age: 35
End: 2025-02-07

## 2025-02-07 NOTE — PROGRESS NOTES
HCC coding opportunities       Chart reviewed, no opportunity found: CHART REVIEWED, NO OPPORTUNITY FOUND   This is a reminder to address (resolve/update/assess) ALL HCC (risk adjustment) codes as found on active problem list for 2025 as patient scores reset to zero DEVIN     Patients Insurance        Commercial Insurance: [employer]

## 2025-02-14 ENCOUNTER — OFFICE VISIT (OUTPATIENT)
Dept: FAMILY MEDICINE CLINIC | Facility: CLINIC | Age: 35
End: 2025-02-14

## 2025-02-14 VITALS
SYSTOLIC BLOOD PRESSURE: 108 MMHG | WEIGHT: 214 LBS | DIASTOLIC BLOOD PRESSURE: 68 MMHG | HEIGHT: 73 IN | TEMPERATURE: 97.7 F | OXYGEN SATURATION: 99 % | BODY MASS INDEX: 28.36 KG/M2 | HEART RATE: 74 BPM

## 2025-02-14 DIAGNOSIS — F32.A ANXIETY AND DEPRESSION: Primary | ICD-10-CM

## 2025-02-14 DIAGNOSIS — E55.9 VITAMIN D INSUFFICIENCY: ICD-10-CM

## 2025-02-14 DIAGNOSIS — F41.9 ANXIETY AND DEPRESSION: Primary | ICD-10-CM

## 2025-02-14 PROBLEM — J34.2 DEVIATED NASAL SEPTUM: Status: ACTIVE | Noted: 2025-02-14

## 2025-02-14 NOTE — ASSESSMENT & PLAN NOTE
Depression Screening Follow-up Plan: Patient's depression screening was positive with a PHQ-2 score of 5. Their PHQ-9 score was 12. Patient assessed for underlying major depression. They have no active suicidal ideations. Brief counseling provided and recommend additional follow-up/re-evaluation next office visit. BULL score of 6. Declined starting medication, would like to try talk therapy first.  TSH within normal limit  Discussed trial of SSRI in addition to talk therapy, patient only amenable to talk therapy at this time.  However, states he will reconsider if needed.  Discussed continuing exercising, meditation.  ED precautions discussed  Advised to reach back with any concerns or if he is interested in starting therapy  RTC in 4 to 6 weeks for annual physical and depression follow-up

## 2025-02-14 NOTE — PROGRESS NOTES
Name: Johnny Nieto      : 1990      MRN: [id number]  Encounter Provider: Carolina James MD  Encounter Date: 2025   Encounter department: [facility]  :  Assessment & Plan  Anxiety and depression  Depression Screening Follow-up Plan: Patient's depression screening was positive with a PHQ-2 score of 5. Their PHQ-9 score was 12. Patient assessed for underlying major depression. They have no active suicidal ideations. Brief counseling provided and recommend additional follow-up/re-evaluation next office visit. BULL score of 6. Declined starting medication, would like to try talk therapy first.  TSH within normal limit  Discussed trial of SSRI in addition to talk therapy, patient only amenable to talk therapy at this time.  However, states he will reconsider if needed.  Discussed continuing exercising, meditation.  ED precautions discussed  Advised to reach back with any concerns or if he is interested in starting therapy  RTC in 4 to 6 weeks for annual physical and depression follow-up         Vitamin D insufficiency  Vitamin D 25.1 in insufficient range  Recommended OTC Vitamin D 800-1000 IU       Lipid panel discussed; noted hide normal levels.  Recommended to continue physical activity as well as dietary modifications.      Depression Screening and Follow-up Plan: Patient's depression screening was positive with a PHQ-2 score of 5. Their PHQ-9 score was 12.   Patient assessed for underlying major depression. Brief counseling provided and recommend additional follow-up/re-evaluation next office visit.       History of Present Illness   Johnny is here for a lab follow up.     Since our last appointment he followed up with ENT who recommended Mometasone rinses and potential surgery if needed in the future.     Patient continues to be fatigued, depressed and anxious.  He is currently on the wait list for talk therapy.  Patient has been exercising weekly as well as  "meditating which helps.  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   He would be amenable on talk therapy online.  Currently aversed to medication.  Denies SI, HI, hallucinations.      Review of Systems   Constitutional:  Negative for fatigue and fever.   Respiratory:  Negative for shortness of breath.    Cardiovascular:  Negative for chest pain.   Gastrointestinal:  Negative for nausea and vomiting.   Neurological:  Negative for dizziness, weakness, light-headedness and numbness.   Psychiatric/Behavioral:  Negative for self-injury, sleep disturbance and suicidal ideas. The patient is nervous/anxious.        Objective   /68 (BP Location: Left arm, Patient Position: Sitting, Cuff Size: Standard)   Pulse 74   Temp 97.7 °F (36.5 °C) (Tympanic)   Ht 6' 1\" (1.854 m)   Wt 97.1 kg (214 lb)   SpO2 99%   BMI 28.23 kg/m²      Physical Exam  Constitutional:       General: He is not in acute distress.     Appearance: Normal appearance. He is not ill-appearing.   Cardiovascular:      Rate and Rhythm: Normal rate.      Pulses: Normal pulses.   Pulmonary:      Effort: Pulmonary effort is normal.      Breath sounds: Normal breath sounds. No wheezing or rales.   Skin:     General: Skin is warm.      Capillary Refill: Capillary refill takes less than 2 seconds.   Neurological:      General: No focal deficit present.      Mental Status: He is alert.   Psychiatric:         Attention and Perception: Attention and perception normal.         Mood and Affect: Mood is depressed.         Speech: Speech normal.         Behavior: Behavior normal.         Thought Content: Thought content normal. Thought content does not include homicidal or suicidal ideation. Thought content does not include homicidal or suicidal plan.         Cognition and Memory: Cognition and memory normal.         Judgment: Judgment normal.         "

## 2025-03-21 ENCOUNTER — TELEPHONE (OUTPATIENT)
Age: 35
End: 2025-03-21

## 2025-03-21 NOTE — TELEPHONE ENCOUNTER
Patient on wait list for talk therapy services. Writer reached out to verify if Pt is still interested in service, and if would like to remain on [facility]. No answer, lvm for a callback at [phone].    Note: Upon Pt's callback, please verify;   Provider preference  Location preferred  Virtual/ In Person

## 2025-03-25 NOTE — TELEPHONE ENCOUNTER
Patient returned call on wait list preferences, he is still interested in services, [place], would prefer a female but open to a male and would also be open to virtual.

## 2025-03-28 ENCOUNTER — OFFICE VISIT (OUTPATIENT)
Dept: FAMILY MEDICINE CLINIC | Facility: CLINIC | Age: 35
End: 2025-03-28
Payer: COMMERCIAL

## 2025-03-28 VITALS
HEART RATE: 66 BPM | TEMPERATURE: 97.8 F | SYSTOLIC BLOOD PRESSURE: 110 MMHG | BODY MASS INDEX: 28.85 KG/M2 | WEIGHT: 213 LBS | OXYGEN SATURATION: 100 % | HEIGHT: 72 IN | DIASTOLIC BLOOD PRESSURE: 67 MMHG

## 2025-03-28 DIAGNOSIS — Z00.00 ANNUAL PHYSICAL EXAM: Primary | ICD-10-CM

## 2025-03-28 PROCEDURE — 99395 PREV VISIT EST AGE 18-39: CPT

## 2025-03-28 NOTE — PATIENT INSTRUCTIONS
"Patient Education     Routine physical for adults   The Basics   Written by the doctors and editors at [facility]   What is a physical? -- A physical is a routine visit, or \"check-up,\" with your doctor. You might also hear it called a \"wellness visit\" or \"preventive visit.\"  During each visit, the doctor will:   Ask about your physical and mental health   Ask about your habits, behaviors, and lifestyle   Do an exam   Give you vaccines if needed   Talk to you about any medicines you take   Give advice about your health   Answer your questions  Getting regular check-ups is an important part of taking care of your health. It can help your doctor find and treat any problems you have. But it's also important for preventing health problems.  A routine physical is different from a \"sick visit.\" A sick visit is when you see a doctor because of a health concern or problem. Since physicals are scheduled ahead of time, you can think about what you want to ask the doctor.  How often should I get a physical? -- It depends on your age and health. In general, for people age 21 years and older:   If you are younger than 50 years, you might be able to get a physical every 3 years.   If you are 50 years or older, your doctor might recommend a physical every year.  If you have an ongoing health condition, like diabetes or high blood pressure, your doctor will probably want to see you more often.  What happens during a physical? -- In general, each visit will include:   Physical exam - The doctor or nurse will check your height, weight, heart rate, and blood pressure. They will also look at your eyes and ears. They will ask about how you are feeling and whether you have any symptoms that bother you.   Medicines - It's a good idea to bring a list of all the medicines you take to each doctor visit. Your doctor will talk to you about your medicines and answer any questions. Tell them if you are having any side effects that bother you. You " "should also tell them if you are having trouble paying for any of your medicines.   Habits and behaviors - This includes:   Your diet   Your exercise habits   Whether you smoke, drink alcohol, or use drugs   Whether you are sexually active   Whether you feel safe at home  Your doctor will talk to you about things you can do to improve your health and lower your risk of health problems. They will also offer help and support. For example, if you want to quit smoking, they can give you advice and might prescribe medicines. If you want to improve your diet or get more physical activity, they can help you with this, too.   Lab tests, if needed - The tests you get will depend on your age and situation. For example, your doctor might want to check your:   Cholesterol   Blood sugar   Iron level   Vaccines - The recommended vaccines will depend on your age, health, and what vaccines you already had. Vaccines are very important because they can prevent certain serious or deadly infections.   Discussion of screening - \"Screening\" means checking for diseases or other health problems before they cause symptoms. Your doctor can recommend screening based on your age, risk, and preferences. This might include tests to check for:   Cancer, such as breast, prostate, cervical, ovarian, colorectal, prostate, lung, or skin cancer   Sexually transmitted infections, such as chlamydia and gonorrhea   Mental health conditions like depression and anxiety  Your doctor will talk to you about the different types of screening tests. They can help you decide which screenings to have. They can also explain what the results might mean.   Answering questions - The physical is a good time to ask the doctor or nurse questions about your health. If needed, they can refer you to other doctors or specialists, too.  Adults older than 65 years often need other care, too. As you get older, your doctor will talk to you about:   How to prevent falling at " home   Hearing or vision tests   Memory testing   How to take your medicines safely   Making sure that you have the help and support you need at home  All topics are updated as new evidence becomes available and our peer review process is complete.  This topic retrieved from [employer] on: May 02, 2024.  Topic 882057 Version 1.0  Release: 32.4.3 - C32.122  © 2024 UpToDate, Inc. and/or its affiliates. All rights reserved.  Consumer Information Use and Disclaimer   Disclaimer: This generalized information is a limited summary of diagnosis, treatment, and/or medication information. It is not meant to be comprehensive and should be used as a tool to help the user understand and/or assess potential diagnostic and treatment options. It does NOT include all information about conditions, treatments, medications, side effects, or risks that may apply to a specific patient. It is not intended to be medical advice or a substitute for the medical advice, diagnosis, or treatment of a health care provider based on the health care provider's examination and assessment of a patient's specific and unique circumstances. Patients must speak with a health care provider for complete information about their health, medical questions, and treatment options, including any risks or benefits regarding use of medications. This information does not endorse any treatments or medications as safe, effective, or approved for treating a specific patient. UpToDate, Inc. and its affiliates disclaim any warranty or liability relating to this information or the use thereof.The use of this information is governed by the Terms of Use, available at https://www.woltersinterspireSubmituwer.com/en/know/clinical-effectiveness-terms. 2024© UpToDate, Inc. and its affiliates and/or licensors. All rights reserved.  Copyright   © 2024 UpToDate, Inc. and/or its affiliates. All rights reserved.

## 2025-03-28 NOTE — PROGRESS NOTES
"Adult Annual Physical  Name: Johnny Nieto      : 1990      MRN: [id number]  Encounter Provider: Compa Chamberlain DO  Encounter Date: 3/28/2025   Encounter department: [facility]    Assessment & Plan  Annual physical exam  Feeling well, no acute concerns. Mood improved since last visit due to change is season and job stability. Will remain on wait list for talk therapy. Will  previously prescribed vitamin D. Potentially slightly reduced strength of urinary stream but no other associated symptoms or family history of prostate cancer. Will continue to monitor.          Preventive Screenings:  - Diabetes Screening: screening up-to-date  - Cholesterol Screening: screening up-to-date   - Hepatitis C screening: screening up-to-date   - HIV screening: screening up-to-date   - Colon cancer screening: screening not indicated   - Lung cancer screening: screening not indicated   - Prostate cancer screening: screening not indicated     Immunizations:  - Immunizations due: Influenza and Tdap         History of Present Illness         34 year old M presents for annual physical. Today he notes that he had been feeling \"down\" for several months, but that is now improving with better weather. He also notes that a more stable situation at work has been helpful. Reports that personal life is good. Some trouble losing weight. Walking and going to the gym 3-4 x/week.     Adult Annual Physical:  Patient presents for annual physical.     Diet and Physical Activity:  - Diet/Nutrition: well balanced diet.  - Exercise: walking, strength training exercises and 3-4 times a week on average.    General Health:  - Sleep: sleeps well.  - Hearing: normal hearing left ear and normal hearing right ear.  - Vision: no vision problems.  - Dental: regular dental visits.     Health:  - History of STDs: no.   - Urinary symptoms: none.     Review of Systems   Constitutional:  Negative for activity change, chills, " "fatigue and fever.   HENT:  Negative for congestion, dental problem and trouble swallowing.    Eyes:  Negative for visual disturbance.   Respiratory:  Negative for chest tightness and shortness of breath.    Cardiovascular:  Negative for chest pain.   Gastrointestinal:  Negative for abdominal pain, nausea and vomiting.   Endocrine: Negative for polyuria.   Genitourinary:  Negative for dysuria and frequency.   Musculoskeletal:  Negative for gait problem.   Skin:  Negative for rash and wound.   Neurological:  Negative for dizziness, weakness and light-headedness.   Psychiatric/Behavioral:  Negative for behavioral problems, confusion, decreased concentration and dysphoric mood. The patient is not nervous/anxious.              Objective   /67 (BP Location: Left arm, Patient Position: Sitting, Cuff Size: Large)   Pulse 66   Temp 97.8 °F (36.6 °C) (Temporal)   Ht 6' 0.44\" (1.84 m)   Wt 96.6 kg (213 lb)   SpO2 100%   BMI 28.54 kg/m²     Physical Exam  Constitutional:       General: He is not in acute distress.     Appearance: Normal appearance. He is not toxic-appearing.   HENT:      Head: Normocephalic and atraumatic.      Right Ear: External ear normal.      Left Ear: External ear normal.      Nose: Nose normal.      Mouth/Throat:      Pharynx: Oropharynx is clear.   Eyes:      General: No scleral icterus.     Conjunctiva/sclera: Conjunctivae normal.      Pupils: Pupils are equal, round, and reactive to light.   Cardiovascular:      Rate and Rhythm: Normal rate and regular rhythm.      Heart sounds: Normal heart sounds.   Pulmonary:      Effort: Pulmonary effort is normal.      Breath sounds: Normal breath sounds. No wheezing, rhonchi or rales.   Abdominal:      Palpations: Abdomen is soft.      Tenderness: There is no abdominal tenderness. There is no guarding.   Musculoskeletal:      Cervical back: Neck supple.      Right lower leg: No edema.      Left lower leg: No edema.   Lymphadenopathy:      Cervical: " No cervical adenopathy.   Skin:     General: Skin is warm and dry.   Neurological:      Mental Status: He is alert and oriented to person, place, and time.      Gait: Gait normal.   Psychiatric:         Mood and Affect: Mood normal.         Behavior: Behavior normal.         Thought Content: Thought content normal.         Judgment: Judgment normal.

## 2025-05-06 ENCOUNTER — TELEPHONE (OUTPATIENT)
Dept: SURGERY | Facility: CLINIC | Age: 35
End: 2025-05-06

## 2025-05-12 ENCOUNTER — APPOINTMENT (OUTPATIENT)
Dept: LAB | Facility: CLINIC | Age: 35
End: 2025-05-12
Payer: COMMERCIAL

## 2025-05-12 DIAGNOSIS — Z20.2 CONTACT W AND EXPOSURE TO INFECT W A SEXL MODE OF TRANSMISS: ICD-10-CM

## 2025-05-12 DIAGNOSIS — Z11.3 ENCOUNTER FOR SCREENING FOR INFECTIONS WITH A PREDOMINANTLY SEXUAL MODE OF TRANSMISSION: ICD-10-CM

## 2025-05-12 DIAGNOSIS — Z77.21 CONTACT W AND EXPOSURE TO POTENTIALLY HAZARDOUS BODY FLUIDS: ICD-10-CM

## 2025-05-12 DIAGNOSIS — Z11.3 ENCOUNTER FOR SPECIAL SCREENING EXAMINATION FOR INFECTION WITH PREDOMINANTLY SEXUAL MODE OF TRANSMISSION: ICD-10-CM

## 2025-05-12 DIAGNOSIS — Z77.21 PERSONAL HISTORY OF EXPOSURE TO POTENTIALLY HAZARDOUS BODY FLUIDS: ICD-10-CM

## 2025-05-12 DIAGNOSIS — Z20.6 CONTACT W AND (SUSPECTED) EXPOSURE TO HUMAN IMMUNODEF VIRUS: ICD-10-CM

## 2025-05-12 DIAGNOSIS — Z29.81 ENCOUNTER FOR PRE-EXPOSURE PROPHYLAXIS FOR HIV: ICD-10-CM

## 2025-05-12 DIAGNOSIS — Z77.21 CONTACT WITH AND (SUSPECTED) EXPOSURE TO POTENTIALLY HAZARDOUS BODY FLUIDS: ICD-10-CM

## 2025-05-12 DIAGNOSIS — Z11.4 SCREENING FOR HUMAN IMMUNODEFICIENCY VIRUS: ICD-10-CM

## 2025-05-12 PROCEDURE — 86780 TREPONEMA PALLIDUM: CPT

## 2025-05-12 PROCEDURE — 87536 HIV-1 QUANT&REVRSE TRNSCRPJ: CPT

## 2025-05-12 PROCEDURE — 87389 HIV-1 AG W/HIV-1&-2 AB AG IA: CPT

## 2025-05-12 PROCEDURE — 87591 N.GONORRHOEAE DNA AMP PROB: CPT

## 2025-05-12 PROCEDURE — 87491 CHLMYD TRACH DNA AMP PROBE: CPT

## 2025-05-12 PROCEDURE — 87535 HIV-1 PROBE&REVERSE TRNSCRPJ: CPT

## 2025-05-12 PROCEDURE — 36415 COLL VENOUS BLD VENIPUNCTURE: CPT

## 2025-05-12 PROCEDURE — 87538 HIV-2 PROBE&REVRSE TRNSCRIPJ: CPT | Performed by: INTERNAL MEDICINE

## 2025-05-13 LAB
C TRACH DNA SPEC QL NAA+PROBE: NEGATIVE
HIV 1+2 AB+HIV1 P24 AG SERPL QL IA: NORMAL
N GONORRHOEA DNA SPEC QL NAA+PROBE: NEGATIVE
TREPONEMA PALLIDUM IGG+IGM AB [PRESENCE] IN SERUM OR PLASMA BY IMMUNOASSAY: NORMAL

## 2025-05-14 LAB
HIV1 RNA # SERPL NAA+PROBE: <20 COPIES/ML
HIV1 RNA SERPL NAA+PROBE-LOG#: NORMAL LOG10COPY/ML

## 2025-05-15 ENCOUNTER — SEXUAL HEALTH (OUTPATIENT)
Dept: SURGERY | Facility: CLINIC | Age: 35
End: 2025-05-15
Payer: COMMERCIAL

## 2025-05-15 VITALS
HEART RATE: 68 BPM | SYSTOLIC BLOOD PRESSURE: 114 MMHG | DIASTOLIC BLOOD PRESSURE: 72 MMHG | OXYGEN SATURATION: 99 % | TEMPERATURE: 97.8 F

## 2025-05-15 DIAGNOSIS — Z11.3 ENCOUNTER FOR SPECIAL SCREENING EXAMINATION FOR INFECTION WITH PREDOMINANTLY SEXUAL MODE OF TRANSMISSION: ICD-10-CM

## 2025-05-15 DIAGNOSIS — Z20.6 CONTACT W AND (SUSPECTED) EXPOSURE TO HUMAN IMMUNODEF VIRUS: ICD-10-CM

## 2025-05-15 DIAGNOSIS — Z11.59 ENCOUNTER FOR SCREENING FOR OTHER VIRAL DISEASES: ICD-10-CM

## 2025-05-15 DIAGNOSIS — Z11.3 ENCOUNTER FOR SCREENING FOR INFECTIONS WITH A PREDOMINANTLY SEXUAL MODE OF TRANSMISSION: ICD-10-CM

## 2025-05-15 DIAGNOSIS — Z20.2 CONTACT W AND EXPOSURE TO INFECT W A SEXL MODE OF TRANSMISS: ICD-10-CM

## 2025-05-15 DIAGNOSIS — Z11.4 SCREENING FOR HUMAN IMMUNODEFICIENCY VIRUS: ICD-10-CM

## 2025-05-15 DIAGNOSIS — Z77.21 CONTACT W AND EXPOSURE TO POTENTIALLY HAZARDOUS BODY FLUIDS: ICD-10-CM

## 2025-05-15 DIAGNOSIS — Z29.81 ENCOUNTER FOR PRE-EXPOSURE PROPHYLAXIS FOR HIV: Primary | ICD-10-CM

## 2025-05-15 DIAGNOSIS — Z77.21 CONTACT WITH AND (SUSPECTED) EXPOSURE TO POTENTIALLY HAZARDOUS BODY FLUIDS: ICD-10-CM

## 2025-05-15 DIAGNOSIS — Z77.21 PERSONAL HISTORY OF EXPOSURE TO POTENTIALLY HAZARDOUS BODY FLUIDS: ICD-10-CM

## 2025-05-15 LAB — MISCELLANEOUS LAB TEST RESULT: NORMAL

## 2025-05-15 PROCEDURE — 99213 OFFICE O/P EST LOW 20 MIN: CPT | Performed by: INTERNAL MEDICINE

## 2025-05-15 RX ORDER — EMTRICITABINE AND TENOFOVIR ALAFENAMIDE 200; 25 MG/1; MG/1
1 TABLET ORAL DAILY
Qty: 90 TABLET | Refills: 0 | Status: SHIPPED | OUTPATIENT
Start: 2025-05-15 | End: 2025-08-13

## 2025-05-15 NOTE — PROGRESS NOTES
Assessment/Plan:         Problem List Items Addressed This Visit    None  Visit Diagnoses         Encounter for pre-exposure prophylaxis for HIV    -  Primary    Relevant Orders    Chlamydia/GC amplified DNA by PCR    HIV 1/2 AG/AB w Reflex [facility] for 2 yr old and above    HIV-1/HIV-2 Qualitative RNA    RPR-Syphilis Screening (Total Syphilis IGG/IGM)    Hepatitis C antibody      Contact w and exposure to infect w a sexl mode of transmiss        Relevant Orders    Chlamydia/GC amplified DNA by PCR    HIV 1/2 AG/AB w Reflex [facility] for 2 yr old and above    HIV-1/HIV-2 Qualitative RNA    RPR-Syphilis Screening (Total Syphilis IGG/IGM)    Hepatitis C antibody      Encounter for special screening examination for infection with predominantly sexual mode of transmission        Relevant Orders    Chlamydia/GC amplified DNA by PCR    HIV 1/2 AG/AB w Reflex [facility] for 2 yr old and above    HIV-1/HIV-2 Qualitative RNA    RPR-Syphilis Screening (Total Syphilis IGG/IGM)    Hepatitis C antibody      Personal history of exposure to potentially hazardous body fluids        Relevant Orders    Chlamydia/GC amplified DNA by PCR    HIV 1/2 AG/AB w Reflex [facility] for 2 yr old and above    HIV-1/HIV-2 Qualitative RNA    RPR-Syphilis Screening (Total Syphilis IGG/IGM)    Hepatitis C antibody      Contact w and (suspected) exposure to human immunodef virus        Relevant Orders    HIV 1/2 AG/AB w Reflex [facility] for 2 yr old and above    HIV-1/HIV-2 Qualitative RNA      Screening for human immunodeficiency virus        Relevant Orders    HIV 1/2 AG/AB w Reflex [facility] for 2 yr old and above    HIV-1/HIV-2 Qualitative RNA      Contact w and exposure to potentially hazardous body fluids        Relevant Orders    Chlamydia/GC amplified DNA by PCR    HIV 1/2 AG/AB w Reflex [facility] for 2 yr old and above    HIV-1/HIV-2 Qualitative RNA    RPR-Syphilis Screening (Total Syphilis IGG/IGM)    Hepatitis C antibody      Encounter for screening for infections  with a predominantly sexual mode of transmission        Relevant Orders    Chlamydia/GC amplified DNA by PCR    HIV 1/2 AG/AB w Reflex [facility] for 2 yr old and above    HIV-1/HIV-2 Qualitative RNA    RPR-Syphilis Screening (Total Syphilis IGG/IGM)    Hepatitis C antibody      Encounter for screening for other viral diseases        Relevant Orders    Hepatitis C antibody      Contact with and (suspected) exposure to potentially hazardous body fluids        Relevant Medications    emtricitabine-tenofovir AF (Descovy) 200-25 MG tablet          Reviewed lab results with pt.   Next visit labs: HIV RNA, RPR, CT/GC  Take Descovy 1 tablet daily by mouth.  90 tablets, No refills  Follow up visit in 3 months with lab work complete blood work 1 week prior to next visit.  Stressed the importance of 100% medication adherence and to take his medications the same time every day  Stressed the importance of wearing condoms to prevent the transmission of HIV and STD's  Call the office with any side effects, adverse effects, or questions or concerns  Reviewed the risks vs benefits of Descovy. Discussed at length the potential side effects of medication, including but not limited to renal, hepatic, bone health side effects.  Explained in detail the expectations of follow up appointments. Reminded pt. to f/u with PCP regularly.    Discussed that Descovy does not offer 100% protection against HIV infection and I have recommended continued use of condoms with every sexual encounter. Discussed that Descovy does not protect against any other STD than HIV  Practicing safe sex using a condom for each sexually encounter.  Condoms offer the best protection against STD's by acting as a physical barrier to prevent the exchange of semen, vaginal fluids, and blood between partners.  Condoms are not a 100% effective in protection.   Reducing the number of sexual partners can also help to reduce the risk of the transmission of STD's along with regular  STD screening.  Pt states understanding     Subjective:      Patient ID: Johnny Nieto is a 35 y.o. male.    Patient seen and 3-month PrEP follow-up.  Patient reports being compliant with medication.  Denies any medication side effects.  Reports 1 male partner in the last 3 months.  Reports oral sex only with no condom use.  Denies urethral discharge, burning or pain with urination, rashes or sores, testicular pain or swelling, rashes or sores.    The following portions of the patient's history were reviewed and updated as appropriate: allergies, current medications, past family history, past medical history, past social history, past surgical history, and problem list.    Review of Systems   Constitutional:  Negative for chills, diaphoresis, fatigue and fever.   HENT:  Negative for congestion and sore throat.    Respiratory:  Negative for cough, chest tightness, shortness of breath and wheezing.    Cardiovascular:  Negative for chest pain, palpitations and leg swelling.   Gastrointestinal:  Negative for abdominal pain, diarrhea, nausea and vomiting.   Genitourinary:  Negative for difficulty urinating, dysuria, genital sores, hematuria, penile discharge, penile swelling, testicular pain and urgency.   Skin:  Negative for rash and wound.   Neurological:  Negative for dizziness, weakness and headaches.       Objective:      There were no vitals taken for this visit.         Physical Exam  Vitals and nursing note reviewed.   Constitutional:       General: He is not in acute distress.     Appearance: Normal appearance. He is not ill-appearing, toxic-appearing or diaphoretic.   HENT:      Head: Normocephalic and atraumatic.      Nose: Nose normal. No congestion or rhinorrhea.      Mouth/Throat:      Mouth: Mucous membranes are moist.      Pharynx: Oropharynx is clear. No oropharyngeal exudate.     Eyes:      General: No scleral icterus.      Cardiovascular:      Rate and Rhythm: Normal rate and regular rhythm.       Heart sounds: Normal heart sounds.   Pulmonary:      Effort: Pulmonary effort is normal. No respiratory distress.      Breath sounds: Normal breath sounds. No wheezing or rales.     Skin:     General: Skin is warm and dry.      Coloration: Skin is not jaundiced or pale.     Neurological:      Mental Status: He is alert and oriented to person, place, and time.      Gait: Gait normal.     Psychiatric:         Behavior: Behavior normal.         Thought Content: Thought content normal.         Judgment: Judgment normal.              CHIEF CONCERN(S)        Condom Used: Occasionally    Sexual Preference :  Male    Date of Last Sexual Exposure: 4/15/2025    # of Partners: Last 30 days 1, Last 90 days 2, and Last Year 6    Sexual Practices: Oral and Anal      Previously Sexually Transmitted Diseases  Type Date Source of Care Treatment Comment   N/A                         Test Date Results   RPR 5/2025 Non reactive    HIV 5/2025 Non reactive   GC 5/2025 neg   CT 5/2025 neg         1. CURRENT RISK BEHAVIOR(S)    Unprotected sex with multiple/anonymous partners and Other     PREVIOUS SUCCESSES    Used condoms when having sex, Maintained a monogamous relationship with only one partner, and Discussed condom use prior to having sex with partner(s)        3. SAFER GOAL BEHAVIOR(S)    Pre-exposure prophylaxis (PrEP)          4. PERSON ACTION PLAN:    > BARRIERS:    No condom use or discussions    > BENEFITS:    Obtain free condoms from [facility] to decrease STD exposure and Provides a healthier sexual relationship and can reduce STD's        > ACTION STEPS:      Use condoms at least 50% of the time and steadily increase over time until condom use is 100% of the time, Choose to abstain from sex, and Get tested is an exposure occurred such as a condom breaks/ leaked          4. REFERRALS:      HIV consent for next testing.

## 2025-08-07 ENCOUNTER — TELEPHONE (OUTPATIENT)
Dept: SURGERY | Facility: CLINIC | Age: 35
End: 2025-08-07

## 2025-08-08 ENCOUNTER — APPOINTMENT (OUTPATIENT)
Dept: LAB | Facility: CLINIC | Age: 35
End: 2025-08-08
Payer: COMMERCIAL

## 2025-08-14 ENCOUNTER — SEXUAL HEALTH (OUTPATIENT)
Dept: SURGERY | Facility: CLINIC | Age: 35
End: 2025-08-14
Payer: COMMERCIAL